# Patient Record
Sex: FEMALE | Race: OTHER | NOT HISPANIC OR LATINO
[De-identification: names, ages, dates, MRNs, and addresses within clinical notes are randomized per-mention and may not be internally consistent; named-entity substitution may affect disease eponyms.]

---

## 2021-05-10 ENCOUNTER — TRANSCRIPTION ENCOUNTER (OUTPATIENT)
Age: 36
End: 2021-05-10

## 2021-05-10 ENCOUNTER — APPOINTMENT (OUTPATIENT)
Dept: PULMONOLOGY | Facility: CLINIC | Age: 36
End: 2021-05-10
Payer: COMMERCIAL

## 2021-05-10 VITALS
DIASTOLIC BLOOD PRESSURE: 70 MMHG | SYSTOLIC BLOOD PRESSURE: 110 MMHG | WEIGHT: 137 LBS | RESPIRATION RATE: 16 BRPM | BODY MASS INDEX: 21.5 KG/M2 | HEART RATE: 91 BPM | OXYGEN SATURATION: 98 % | TEMPERATURE: 97.7 F | HEIGHT: 67 IN

## 2021-05-10 DIAGNOSIS — Z80.3 FAMILY HISTORY OF MALIGNANT NEOPLASM OF BREAST: ICD-10-CM

## 2021-05-10 DIAGNOSIS — Z87.09 PERSONAL HISTORY OF OTHER DISEASES OF THE RESPIRATORY SYSTEM: ICD-10-CM

## 2021-05-10 PROCEDURE — ZZZZZ: CPT

## 2021-05-10 PROCEDURE — 71046 X-RAY EXAM CHEST 2 VIEWS: CPT

## 2021-05-10 PROCEDURE — 94618 PULMONARY STRESS TESTING: CPT

## 2021-05-10 PROCEDURE — 99204 OFFICE O/P NEW MOD 45 MIN: CPT | Mod: 25

## 2021-05-10 PROCEDURE — 94729 DIFFUSING CAPACITY: CPT

## 2021-05-10 PROCEDURE — 95012 NITRIC OXIDE EXP GAS DETER: CPT

## 2021-05-10 PROCEDURE — 94010 BREATHING CAPACITY TEST: CPT

## 2021-05-10 PROCEDURE — 94727 GAS DIL/WSHOT DETER LNG VOL: CPT

## 2021-05-10 PROCEDURE — 99072 ADDL SUPL MATRL&STAF TM PHE: CPT

## 2021-05-10 RX ORDER — ELASTIC BANDAGE 2"X2.2YD
BANDAGE TOPICAL
Refills: 0 | Status: ACTIVE | COMMUNITY

## 2021-05-10 NOTE — ASSESSMENT
[FreeTextEntry1] : Ms. HORTON is a 35 year female with a history of dextrocardia, pneumothorax on the left lung (2015)), VATS pleurodesis (2015), reactive airways growing up (?asthma), receiving IVF for infertility, who now comes in for an initial pulmonary evaluation for intra infertility therapy, SOB, RADS/asthma, prior left sided spontaneous PTX\par \par \par The patient's SOB is felt to be multifactorial:\par -poor mechanics of breathing\par -out of shape\par -Pulmonary\par  -Hx of spontaneous PTX\par  -volume loss (c/w restrictive lung disease)\par  -RADS/asthma\par  -anemia\par -Cardiac\par  -dextrocardia (Daniel Goldberg)\par \par Problem 1: RADS/asthma\par -add Bevespi 2 puffs BID followed by Qvar Redihaler 80 2 puffs BID\par -continue ProAir 2 puffs Q6H, pre-exercise\par - Inhaler technique reviewed as well as oral hygiene technique reviewed with patient. Avoidance of cold air, extremes of temperature, rescue inhaler should be used before exercise. Order of medication reviewed with patient. Recommended use of a cool mist humidifier in the bedroom. \par - Asthma is believed to be caused by inherited (genetic) and environmental factor, but its exact cause is unknown. asthma may be triggered by allergens, lung infections, or irritants in the air. Asthma triggers are different for each person \par \par Problem 2: Allergies\par -get Blood work to include: asthma panel, food IgE panel, IgE level, eosinophil level, vitamin D level\par -Environmental measures for allergies were encouraged including mattress and pillow cover, air purifier, and environmental controls. \par \par Problem 3: Anemia\par -Complete iron studies\par \par Problem 4: Hx of pneumothorax s/p pleurodesis\par -appears to be quiescent\par The patient has a pneumothorax from trauma, iatrogenic, post surgical. You are at risk for one in the future and may need hospitalization. Thoracic surgical evaluation is needed for chest tube and other intervention. \par \par Problem 5: Pre-op - cleared\par -at this point in time there are no absolute pulmonary contraindications to go forward with the planned procedure \par -at the time of surgery s/he should have optimal pain control, incentive spirometry, early ambulation, DVT and GI prophylaxis.\par \par Problem 6:Cardiac\par -Recommend cardiac follow up evaluation with cardiologist Dr. Joel Goldberg if needed \par \par Problem 7:overweight/out of shape\par - Weight loss, exercise and diet control were discussed and are highly encouraged. Treatment options were given such as aqua therapy, and contacting a nutritionist. Recommended to use the elliptical, stationary bike, less use of treadmill. Mindful eating was explained to the patient. Obesity is associated with worsening asthma, SOB, and potential for cardiac disease, diabetes, and other underlying medical conditions.\par \par Problem 8: Poor mechanics of breathing\par - Recommended "The Gift of Maybe" by Susie Ba for anxiety\par -Recommend Wimhof and Buteyko breathing techniques \par - Proper breathing techniques were reviewed with an emphasis on exhalation. Patient instructed to breath in for 1 second and out for four seconds. Patient was encouraged not to talk while walking.\par \par Problem 9: Health Maintenance\par -s/p COVID-19 vaccine x2 2/2021\par -s/p flu shot\par -recommended strep pneumonia vaccines: Prevnar-13 vaccine, follow by Pneumo vaccine 23 one year following\par -recommended early intervention for URIs\par -recommended regular osteoporosis evaluations\par -recommended early dermatological evaluations\par -recommended after the age of 50 to the age of 70, colonoscopy every 5 years\par \par f/u in 6-8 weeks\par pt is encouraged to call or fax the office with any questions or concerns.\par

## 2021-05-10 NOTE — PHYSICAL EXAM
[No Acute Distress] : no acute distress [Normal Oropharynx] : normal oropharynx [II] : Mallampati Class: II [Normal Appearance] : normal appearance [No Neck Mass] : no neck mass [Normal Rate/Rhythm] : normal rate/rhythm [Normal S1, S2] : normal s1, s2 [No Murmurs] : no murmurs [No Resp Distress] : no resp distress [Clear to Auscultation Bilaterally] : clear to auscultation bilaterally [No Abnormalities] : no abnormalities [Benign] : benign [Normal Gait] : normal gait [No Clubbing] : no clubbing [No Cyanosis] : no cyanosis [No Edema] : no edema [FROM] : FROM [Normal Color/ Pigmentation] : normal color/ pigmentation [No Focal Deficits] : no focal deficits [Oriented x3] : oriented x3 [Normal Affect] : normal affect [TextBox_68] : I:E 1:3; no breath sounds on the right side, right sided cardiac sounds, left side clear

## 2021-05-10 NOTE — ADDENDUM
[FreeTextEntry1] : Documented by Wagner Gutierrez acting as a scribe for Dr. Vladimir Bennett on (05/10/2021).\par \par All medical record entries made by the Scribe were at my, Dr. Vladimir Bennett's, direction and personally dictated by me on (05/10/2021). I have reviewed the chart and agree that the record accurately reflects my personal performance of the history, physical exam, assessment and plan. I have also personally directed, reviewed, and agree with the discharge instructions.\par

## 2021-05-10 NOTE — HISTORY OF PRESENT ILLNESS
[TextBox_4] : Ms. HORTON is a 35 year female with a history of dextrocardia, pneumothorax on the left lung (2015)), VATS pleurodesis (2015), reactive airways growing up (?asthma), receiving IVF for infertility, who now comes in for an initial pulmonary evaluation for intra infertility therapy. Her chief complaint is\par -she notes getting SOB, and SOB on stairs\par -she notes the SOB has been consistent\par -she notes cardio makes her SOB\par -she denies snoring\par -she denies hoarseness \par -she notes RLS that can wake her up - feels like a numbness\par -she notes her vision is normal\par -she notes the cold air makes it harder to breathe\par -s/p COVID-19 vaccine 2/21\par -she notes taking a rapid COVID test today and tested negative\par \par - patient denies any headaches, nausea, vomiting, fever, chills, sweats, chest pain, chest pressure, palpitations, coughing, wheezing, fatigue, diarrhea, constipation, dysphagia, myalgias, dizziness, leg swelling, leg pain, itchy eyes, itchy ears, heartburn, reflux or sour taste in the mouth

## 2021-05-10 NOTE — REASON FOR VISIT
[Initial] : an initial visit [TextBox_44] :  intra infertility therapy, SOB, RADS/asthma, prior left sided spontaneous PTX

## 2021-05-10 NOTE — PROCEDURE
[FreeTextEntry1] : CXR revealed a normal sized heart; dextrocardia, volume loss on the right side, left hyperinflation\par \par 6 minute walk test reveals a low saturation of 98% with moderate dyspnea or fatigue; walked 586.8 meters.\par \par Feno was 13; a normal value being less than 25. Fractional exhaled nitric oxide (FENO) is regarded as a simple, noninvasive method for assessing eosinophilic airway inflammation. Produced by a variety of cells within the lung, nitric oxide (NO) concentrations are generally low in healthy individuals. However, high concentrations of NO appear to be involved in nonspecific host defense mechanisms and chronic inflammatory  diseases such as asthma. The American Thoracic Society (ATS) therefore recommended using FENO to aid in the diagnosis and monitoring of eosinophilic airway inflammation and asthma, and for identifying steroid responsive individuals whose chronic respiratory symptoms may be caused by airway inflammation \par \par Full PFT revealed mild restrictive and severe obstructive dysfunction, with a FEV1 of 1.28L, which is 40% of predicted, normal lung volumes, and a diffusion of 23.0, which is 99% of predicted, with a normal flow volume loop \par \par -Images and procedures reviewed in detail and discussed with patient.

## 2021-05-12 RX ORDER — GLYCOPYRROLATE AND FORMOTEROL FUMARATE 9; 4.8 UG/1; UG/1
9-4.8 AEROSOL, METERED RESPIRATORY (INHALATION)
Qty: 3 | Refills: 1 | Status: DISCONTINUED | COMMUNITY
Start: 2021-05-10 | End: 2021-05-12

## 2021-05-26 ENCOUNTER — APPOINTMENT (OUTPATIENT)
Dept: MATERNAL FETAL MEDICINE | Facility: CLINIC | Age: 36
End: 2021-05-26
Payer: COMMERCIAL

## 2021-05-26 ENCOUNTER — ASOB RESULT (OUTPATIENT)
Age: 36
End: 2021-05-26

## 2021-05-26 PROCEDURE — 99204 OFFICE O/P NEW MOD 45 MIN: CPT | Mod: 95

## 2021-09-01 ENCOUNTER — NON-APPOINTMENT (OUTPATIENT)
Age: 36
End: 2021-09-01

## 2021-09-01 ENCOUNTER — APPOINTMENT (OUTPATIENT)
Dept: OBGYN | Facility: CLINIC | Age: 36
End: 2021-09-01
Payer: COMMERCIAL

## 2021-09-01 VITALS
BODY MASS INDEX: 21.97 KG/M2 | HEIGHT: 67 IN | SYSTOLIC BLOOD PRESSURE: 104 MMHG | WEIGHT: 140 LBS | DIASTOLIC BLOOD PRESSURE: 68 MMHG

## 2021-09-01 DIAGNOSIS — Z86.19 PERSONAL HISTORY OF OTHER INFECTIOUS AND PARASITIC DISEASES: ICD-10-CM

## 2021-09-01 DIAGNOSIS — Z00.00 ENCOUNTER FOR GENERAL ADULT MEDICAL EXAMINATION W/OUT ABNORMAL FINDINGS: ICD-10-CM

## 2021-09-01 DIAGNOSIS — Z13.71 ENCOUNTER FOR NONPROCREATIVE SCREENING FOR GENETIC DISEASE CARRIER STATUS: ICD-10-CM

## 2021-09-01 PROCEDURE — 99202 OFFICE O/P NEW SF 15 MIN: CPT | Mod: 25

## 2021-09-01 PROCEDURE — 99395 PREV VISIT EST AGE 18-39: CPT

## 2021-09-01 NOTE — COUNSELING
[Nutrition/ Exercise/ Weight Management] : nutrition, exercise, weight management [Vitamins/Supplements] : vitamins/supplements [Intimate Partner Violence] : intimate partner violence [Vaccines] : vaccines

## 2021-09-02 ENCOUNTER — LABORATORY RESULT (OUTPATIENT)
Age: 36
End: 2021-09-02

## 2021-09-02 NOTE — PHYSICAL EXAM
[Appropriately responsive] : appropriately responsive [Alert] : alert [No Acute Distress] : no acute distress [No Lymphadenopathy] : no lymphadenopathy [Regular Rate Rhythm] : regular rate rhythm [No Murmurs] : no murmurs [Clear to Auscultation B/L] : clear to auscultation bilaterally [Soft] : soft [Non-tender] : non-tender [Non-distended] : non-distended [No HSM] : No HSM [No Lesions] : no lesions [No Mass] : no mass [Oriented x3] : oriented x3 [Examination Of The Breasts] : a normal appearance [No Masses] : no breast masses were palpable [Labia Majora] : normal [Labia Minora] : normal [Normal] : normal [Uterine Adnexae] : normal [FreeTextEntry4] : RRR on right side (pt has dextrocardia) [FreeTextEntry5] : breathe sounds only heard on L side

## 2021-09-02 NOTE — HISTORY OF PRESENT ILLNESS
[Patient reported PAP Smear was normal] : Patient reported PAP Smear was normal [FreeTextEntry1] : 37yo G1 @ 10'1 by FET of 7/12 (YUVAL 3/29) here for new ob visit. Today is her last day of taking progesterone.  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  \par \par Of note pt has dextrocardia and only has left lung (normal function on PFTs). Pt has spont. PThx and had surgery at [facility] (will forward records to us). \par \par Pt is s/p COVID vaccine.  [PapSmeardate] : 2019

## 2021-09-02 NOTE — DISCUSSION/SUMMARY
[FreeTextEntry1] : 35yo G1 @ 10'1 via FET/IVF (YUVAL 3/29/22) with dextrocardia and single L lung. \par \par New OB:\par - Referred to M for h/o IVF, dextrocardia and single L lung. \par - NIPS offered today, new OB labs including PEC labs ordered today\par - Pt to see PCP, Derm and dentist at discussed \par - Start PNV with FA and DHA (pt denies any FHx for her of Partner suggesting need for more than standard dose of FA)\par - Continue reg exercise \par - d/c etoh \par - travel, exercise, diet precautions reviewed\par - RTO 2 wks for NT and 1st tri screen \par - GC/CT done today\par \par Single Lung:\par - Pt urged to make appt to see pulm ASAP . \par - Will see pulm qTrimester (at least) \par \par Dextrocardia:\par - Pt to see Cards ASAp and then qTrimester \par \par Tomeka Rodriguez MD \par Obstetrician/Gynecologist\par \par

## 2021-09-03 ENCOUNTER — ASOB RESULT (OUTPATIENT)
Age: 36
End: 2021-09-03

## 2021-09-03 ENCOUNTER — APPOINTMENT (OUTPATIENT)
Dept: OBGYN | Facility: CLINIC | Age: 36
End: 2021-09-03
Payer: COMMERCIAL

## 2021-09-03 LAB
ABO + RH PNL BLD: NORMAL
ALBUMIN SERPL ELPH-MCNC: 4.3 G/DL
ALP BLD-CCNC: 63 U/L
ALT SERPL-CCNC: 8 U/L
ANION GAP SERPL CALC-SCNC: 18 MMOL/L
AST SERPL-CCNC: 15 U/L
B19V IGG SER QL IA: 0.49 INDEX
B19V IGG+IGM SER-IMP: NEGATIVE
B19V IGG+IGM SER-IMP: NORMAL
B19V IGM FLD-ACNC: 0.09 INDEX
B19V IGM SER-ACNC: NEGATIVE
BACTERIA UR CULT: NORMAL
BASOPHILS # BLD AUTO: 0.04 K/UL
BASOPHILS NFR BLD AUTO: 0.3 %
BILIRUB SERPL-MCNC: <0.2 MG/DL
BLD GP AB SCN SERPL QL: NORMAL
BUN SERPL-MCNC: 12 MG/DL
C TRACH RRNA SPEC QL NAA+PROBE: NOT DETECTED
CALCIUM SERPL-MCNC: 9.8 MG/DL
CHLORIDE SERPL-SCNC: 100 MMOL/L
CMV IGG SERPL QL: <0.2 U/ML
CMV IGG SERPL-IMP: NEGATIVE
CMV IGM SERPL QL: <8 AU/ML
CMV IGM SERPL QL: NEGATIVE
CO2 SERPL-SCNC: 20 MMOL/L
CREAT SERPL-MCNC: 0.71 MG/DL
CREAT SPEC-SCNC: 50 MG/DL
CREAT/PROT UR: 0.1 RATIO
EOSINOPHIL # BLD AUTO: 0.05 K/UL
EOSINOPHIL NFR BLD AUTO: 0.3 %
GLUCOSE SERPL-MCNC: 48 MG/DL
HBV SURFACE AG SER QL: NONREACTIVE
HCT VFR BLD CALC: 38.8 %
HCV AB SER QL: NONREACTIVE
HCV S/CO RATIO: 0.08 S/CO
HGB A MFR BLD: 97.4 %
HGB A2 MFR BLD: 2.6 %
HGB BLD-MCNC: 12.2 G/DL
HGB FRACT BLD-IMP: NORMAL
HIV1+2 AB SPEC QL IA.RAPID: NONREACTIVE
HPV HIGH+LOW RISK DNA PNL CVX: NOT DETECTED
IMM GRANULOCYTES NFR BLD AUTO: 0.6 %
LYMPHOCYTES # BLD AUTO: 1.96 K/UL
LYMPHOCYTES NFR BLD AUTO: 13.7 %
MAN DIFF?: NORMAL
MCHC RBC-ENTMCNC: 29.8 PG
MCHC RBC-ENTMCNC: 31.4 GM/DL
MCV RBC AUTO: 94.9 FL
MEV IGG FLD QL IA: >300 AU/ML
MEV IGG+IGM SER-IMP: POSITIVE
MONOCYTES # BLD AUTO: 0.62 K/UL
MONOCYTES NFR BLD AUTO: 4.3 %
N GONORRHOEA RRNA SPEC QL NAA+PROBE: NOT DETECTED
NEUTROPHILS # BLD AUTO: 11.54 K/UL
NEUTROPHILS NFR BLD AUTO: 80.8 %
PLATELET # BLD AUTO: 339 K/UL
POTASSIUM SERPL-SCNC: 4.4 MMOL/L
PROT SERPL-MCNC: 7.4 G/DL
PROT UR-MCNC: 7 MG/DL
RBC # BLD: 4.09 M/UL
RBC # FLD: 12.1 %
RUBV IGG FLD-ACNC: 4.3 INDEX
RUBV IGG SER-IMP: POSITIVE
RUBV IGM FLD-ACNC: <20 AU/ML
SODIUM SERPL-SCNC: 138 MMOL/L
SOURCE TP AMPLIFICATION: NORMAL
T GONDII AB SER-IMP: NEGATIVE
T GONDII AB SER-IMP: NEGATIVE
T GONDII IGG SER QL: <3 IU/ML
T GONDII IGM SER QL: <3 AU/ML
T PALLIDUM AB SER QL IA: NEGATIVE
URATE SERPL-MCNC: 4 MG/DL
VZV AB TITR SER: POSITIVE
VZV IGG SER IF-ACNC: >4000 INDEX
WBC # FLD AUTO: 14.3 K/UL

## 2021-09-03 PROCEDURE — 76801 OB US < 14 WKS SINGLE FETUS: CPT

## 2021-09-07 ENCOUNTER — APPOINTMENT (OUTPATIENT)
Dept: OBGYN | Facility: CLINIC | Age: 36
End: 2021-09-07

## 2021-09-07 ENCOUNTER — APPOINTMENT (OUTPATIENT)
Dept: MATERNAL FETAL MEDICINE | Facility: CLINIC | Age: 36
End: 2021-09-07
Payer: COMMERCIAL

## 2021-09-07 ENCOUNTER — APPOINTMENT (OUTPATIENT)
Dept: ANTEPARTUM | Facility: CLINIC | Age: 36
End: 2021-09-07

## 2021-09-07 VITALS
SYSTOLIC BLOOD PRESSURE: 96 MMHG | DIASTOLIC BLOOD PRESSURE: 60 MMHG | RESPIRATION RATE: 16 BRPM | BODY MASS INDEX: 21.82 KG/M2 | HEART RATE: 89 BPM | HEIGHT: 67 IN | OXYGEN SATURATION: 98 % | WEIGHT: 139 LBS

## 2021-09-07 DIAGNOSIS — Z36.9 ENCOUNTER FOR ANTENATAL SCREENING, UNSPECIFIED: ICD-10-CM

## 2021-09-07 DIAGNOSIS — Z01.811 ENCOUNTER FOR PREPROCEDURAL RESPIRATORY EXAMINATION: ICD-10-CM

## 2021-09-07 DIAGNOSIS — Z31.69 ENCOUNTER FOR OTHER GENERAL COUNSELING AND ADVICE ON PROCREATION: ICD-10-CM

## 2021-09-07 DIAGNOSIS — Z78.9 OTHER SPECIFIED HEALTH STATUS: ICD-10-CM

## 2021-09-07 PROCEDURE — 99204 OFFICE O/P NEW MOD 45 MIN: CPT

## 2021-09-07 NOTE — DISCUSSION/SUMMARY
[FreeTextEntry1] : The patient is a 36-year-old -0-1-0 being seen today at approximately 10 weeks for maternal dextrocardia with hypoplastic right lung, spontaneous pneumothorax, anxiety and pregnancy achieved through in vitro fertilization. Her obstetrical hisotry is significant for 1 first trimester complete miscarriage.\par \par An ultrasound was performed on September 3 and revealed a single viable intrauterine gestation with size consistent with dates.  No abnormalities noted.  Vital signs today reveal a blood pressure of 96/60 and maternal weight is 139 pounds consistent with a BMI of 21.77 kg.\par \par Congenital dextrocardia;\par \par The patient was born with dextrocardia.  This was discovered at birth.  Of note the maternal right lung was hypoplastic and considered nonfunctional.  The maternal heart had normal anatomy.  She had a spontaneous pneumothorax in the left lung in  which was surgically corrected.  She is being followed by a pulmonologist.  She is on albuterol, Anoro Ellipta and Qvar without problems or complaints.  Pulmonary consultation has been sent under separate cover.  She understands that with each pregnancy her fetus is at risk for a congenital heart defect.  A fetal echo between 20 and 22 weeks will be ordered.  With a normal functioning heart a normal pregnancy should be expected.  She does complain of some shortness of breath which is more consistent with pregnancy and pulmonary function tests were found to be within normal limits.  A maternal echo was to be performed and those results are not available at the time of consultation.  At this time there is NO contraindication to vaginal delivery.\par \par In vitro fertilization;\par \par This pregnancy was achieved through in vitro fertilization.  The etiology for the fertility issue is unknown which is consistent with 70% of couples.  In vitro fertilization pregnancies are at greater risk for congenital heart defects and a fetal echo will be performed.\par \par Maternal anxiety;\par \par Patient has a long history of maternal anxiety.  She is not currently being medicated.  She understands that anxiety and depression during pregnancy can be medically treated if clinically indicated.  She understands that should these problems become a daily issue with her inability to function on a day-to-day basis medical intervention can be entertained.  Problems and complications related to anxiety medication such as Zoloft and Prozac were discussed.  In addition I am concerned about postpartum depression and signs and symptoms of postpartum depression were discussed.  The patient understands that she should reach out for intervention if needed.\par \par COVID-19 vaccination;\par \par The patient has completed her COVID-19 vaccination program using the Moderna vaccine.  No problems or complications were noted with that vaccination.\par \par Her mother has breast cancer.  She has dextrocardia and a spontaneous pneumothorax.  She has no known allergies to medications and denies alcohol, tobacco or drug use.\par \par I spent a total of 47 minutes reviewing the patient's prenatal record, prenatal blood work, previous consultations and ultrasound reports counseling and coordinating care.\par \par Recommendations;\par \par 1.  Ultra-Screen evaluation in 2 weeks is scheduled.\par 2.  Comprehensive ultrasound at 20 weeks recommended.\par 3.  Discontinue low-dose aspirin after 12 weeks.\par 4.  Fetal echo between 20 and 22 weeks is recommended.\par 5.  Antianxiety medications to be used if clinically indicated.\par 6.  Follow-up maternal-fetal medicine consultation as clinically indicated.\par 7.  Follow-up pulmonary consultation.\par 8.  Cardiac consultation is recommended.

## 2021-09-08 ENCOUNTER — NON-APPOINTMENT (OUTPATIENT)
Age: 36
End: 2021-09-08

## 2021-09-09 ENCOUNTER — NON-APPOINTMENT (OUTPATIENT)
Age: 36
End: 2021-09-09

## 2021-09-13 ENCOUNTER — NON-APPOINTMENT (OUTPATIENT)
Age: 36
End: 2021-09-13

## 2021-09-14 LAB
CFTR MUT TESTED BLD/T: NEGATIVE
CYTOLOGY CVX/VAG DOC THIN PREP: NORMAL
M TB IFN-G BLD-IMP: NEGATIVE
MEV IGM SER QL: <0.91 ISR
QUANTIFERON TB PLUS MITOGEN MINUS NIL: 1.01 IU/ML
QUANTIFERON TB PLUS NIL: 0.01 IU/ML
QUANTIFERON TB PLUS TB1 MINUS NIL: 0 IU/ML
QUANTIFERON TB PLUS TB2 MINUS NIL: 0 IU/ML

## 2021-09-17 ENCOUNTER — ASOB RESULT (OUTPATIENT)
Age: 36
End: 2021-09-17

## 2021-09-17 ENCOUNTER — APPOINTMENT (OUTPATIENT)
Dept: OBGYN | Facility: CLINIC | Age: 36
End: 2021-09-17
Payer: COMMERCIAL

## 2021-09-17 PROCEDURE — 76813 OB US NUCHAL MEAS 1 GEST: CPT

## 2021-09-20 ENCOUNTER — APPOINTMENT (OUTPATIENT)
Dept: OBGYN | Facility: CLINIC | Age: 36
End: 2021-09-20
Payer: COMMERCIAL

## 2021-09-20 VITALS
WEIGHT: 142 LBS | SYSTOLIC BLOOD PRESSURE: 112 MMHG | HEIGHT: 67 IN | DIASTOLIC BLOOD PRESSURE: 78 MMHG | BODY MASS INDEX: 22.29 KG/M2

## 2021-09-20 VITALS — TEMPERATURE: 97.6 F

## 2021-09-20 PROCEDURE — 36415 COLL VENOUS BLD VENIPUNCTURE: CPT

## 2021-09-20 PROCEDURE — 0500F INITIAL PRENATAL CARE VISIT: CPT

## 2021-09-20 PROCEDURE — 99213 OFFICE O/P EST LOW 20 MIN: CPT

## 2021-09-21 ENCOUNTER — NON-APPOINTMENT (OUTPATIENT)
Age: 36
End: 2021-09-21

## 2021-09-21 LAB
BILIRUB UR QL STRIP: NORMAL
CLARITY UR: CLEAR
COLLECTION METHOD: NORMAL
GLUCOSE UR-MCNC: NORMAL
HCG UR QL: 0.2 EU/DL
HGB UR QL STRIP.AUTO: NORMAL
KETONES UR-MCNC: NORMAL
LEUKOCYTE ESTERASE UR QL STRIP: NORMAL
NITRITE UR QL STRIP: NORMAL
PH UR STRIP: 5.5
PROT UR STRIP-MCNC: NORMAL
SP GR UR STRIP: 1.01

## 2021-09-22 LAB — LEAD BLD-MCNC: <1 UG/DL

## 2021-09-23 ENCOUNTER — NON-APPOINTMENT (OUTPATIENT)
Age: 36
End: 2021-09-23

## 2021-09-30 ENCOUNTER — NON-APPOINTMENT (OUTPATIENT)
Age: 36
End: 2021-09-30

## 2021-10-06 LAB
ADDENDUM DOC: NORMAL
AFP PNL SERPL: NORMAL
AFP SERPL-ACNC: NORMAL
CLARIM 22Q11.2: NORMAL
CLARIM ADDITIONAL INFO: NORMAL
CLARIM CHROMOSOME 13: NORMAL
CLARIM CHROMOSOME 18: NORMAL
CLARIM CHROMOSOME 21: NORMAL
CLARIM SEX CHROMOSOMES: NORMAL
CLARITEST NIPT W/MICRO: NORMAL
CLINICAL BIOCHEMIST REVIEW: NORMAL
FREE BETA HCG 1ST TRIMESTER: NORMAL
INHIBIN-A 1ST TRIMESTER: NORMAL
Lab: NORMAL
MATERNAL WEIGHT (LBS):: 140
NASAL BONE: PRESENT
NOTES NTD: NORMAL
NT: NORMAL
PAPP-A SERPL-ACNC: NORMAL
PIGF SER-MCNC: NORMAL
TRISOMY 18/3: NORMAL
TYPE OF PREGNANCY:: NORMAL

## 2021-10-11 ENCOUNTER — LABORATORY RESULT (OUTPATIENT)
Age: 36
End: 2021-10-11

## 2021-10-12 ENCOUNTER — APPOINTMENT (OUTPATIENT)
Dept: OBGYN | Facility: CLINIC | Age: 36
End: 2021-10-12
Payer: COMMERCIAL

## 2021-10-12 VITALS
BODY MASS INDEX: 22.91 KG/M2 | HEIGHT: 67 IN | DIASTOLIC BLOOD PRESSURE: 60 MMHG | WEIGHT: 146 LBS | SYSTOLIC BLOOD PRESSURE: 100 MMHG

## 2021-10-12 VITALS — TEMPERATURE: 97.2 F

## 2021-10-12 LAB
BILIRUB UR QL STRIP: NORMAL
CLARITY UR: CLEAR
COLLECTION METHOD: NORMAL
GLUCOSE UR-MCNC: NORMAL
HCG UR QL: 0.2 EU/DL
HGB UR QL STRIP.AUTO: NORMAL
KETONES UR-MCNC: NORMAL
LEUKOCYTE ESTERASE UR QL STRIP: NORMAL
NITRITE UR QL STRIP: NORMAL
PH UR STRIP: 5.5
PROT UR STRIP-MCNC: NORMAL
SP GR UR STRIP: 1.02

## 2021-10-12 PROCEDURE — 36415 COLL VENOUS BLD VENIPUNCTURE: CPT

## 2021-10-12 PROCEDURE — 0502F SUBSEQUENT PRENATAL CARE: CPT

## 2021-10-12 PROCEDURE — 99213 OFFICE O/P EST LOW 20 MIN: CPT

## 2021-10-19 ENCOUNTER — NON-APPOINTMENT (OUTPATIENT)
Age: 36
End: 2021-10-19

## 2021-10-19 ENCOUNTER — TRANSCRIPTION ENCOUNTER (OUTPATIENT)
Age: 36
End: 2021-10-19

## 2021-10-19 ENCOUNTER — APPOINTMENT (OUTPATIENT)
Dept: PULMONOLOGY | Facility: CLINIC | Age: 36
End: 2021-10-19
Payer: COMMERCIAL

## 2021-10-19 VITALS
HEIGHT: 67 IN | HEART RATE: 89 BPM | DIASTOLIC BLOOD PRESSURE: 60 MMHG | BODY MASS INDEX: 23.54 KG/M2 | OXYGEN SATURATION: 99 % | TEMPERATURE: 97.8 F | RESPIRATION RATE: 16 BRPM | WEIGHT: 150 LBS | SYSTOLIC BLOOD PRESSURE: 101 MMHG

## 2021-10-19 PROCEDURE — 94010 BREATHING CAPACITY TEST: CPT

## 2021-10-19 PROCEDURE — 99214 OFFICE O/P EST MOD 30 MIN: CPT | Mod: 25

## 2021-10-19 PROCEDURE — 95012 NITRIC OXIDE EXP GAS DETER: CPT

## 2021-10-19 RX ORDER — BECLOMETHASONE DIPROPIONATE 80 UG/1
80 AEROSOL, METERED RESPIRATORY (INHALATION)
Refills: 0 | Status: DISCONTINUED | COMMUNITY
End: 2021-10-19

## 2021-10-19 NOTE — ADDENDUM
[FreeTextEntry1] : Documented by Delonte Muñoz acting as a scribe for Dr. Vladimir Bennett on (10/19/2021).\par \par All medical record entries made by the Scribe were at my, Dr. Vladimir Bennett's, direction and personally dictated by me on (10/19/2021). I have reviewed the chart and agree that the record accurately reflects my personal performance of the history, physical exam, assessment and plan. I have also personally directed, reviewed, and agree with the discharge instructions.\par

## 2021-10-19 NOTE — REASON FOR VISIT
[Follow-Up] : a follow-up visit [TextBox_44] :  intra infertility therapy, SOB, RADS/asthma, prior left sided spontaneous PTX

## 2021-10-19 NOTE — ASSESSMENT
[FreeTextEntry1] : Ms. HORTON is a 36 year female with a history of dextrocardia, pneumothorax on the left lung (2015)), VATS pleurodesis (2015), reactive airways growing up (?asthma), s/p IVF for infertility, who now comes in for a follow up  pulmonary evaluation for infertility therapy, SOB, RADS/asthma, prior left sided spontaneous PTX - stable\par \par \par The patient's SOB is felt to be multifactorial:\par -poor mechanics of breathing\par -out of shape\par -Pulmonary\par  -Hx of spontaneous PTX\par  -volume loss (c/w restrictive lung disease)\par  -RADS/asthma\par  -anemia\par -Cardiac\par  -dextrocardia (Joel Goldberg)\par \par Problem 1: RADS/asthma\par -continue Bevespi 2 puffs BID followed by Anoro at 1 inhalation QD \par -continue ProAir 2 puffs Q6H, pre-exercise\par - Inhaler technique reviewed as well as oral hygiene technique reviewed with patient. Avoidance of cold air, extremes of temperature, rescue inhaler should be used before exercise. Order of medication reviewed with patient. Recommended use of a cool mist humidifier in the bedroom. \par - Asthma is believed to be caused by inherited (genetic) and environmental factor, but its exact cause is unknown. asthma may be triggered by allergens, lung infections, or irritants in the air. Asthma triggers are different for each person \par \par Problem 2: Allergies\par -get Blood work to include: asthma panel, food IgE panel, IgE level, eosinophil level, vitamin D level ([place])\par -Environmental measures for allergies were encouraged including mattress and pillow cover, air purifier, and environmental controls. \par \par Problem 3: Anemia\par -Complete iron studies\par \par Problem 4: Hx of pneumothorax s/p pleurodesis\par -appears to be quiescent\par The patient has a pneumothorax from trauma, iatrogenic, post surgical. You are at risk for one in the future and may need hospitalization. Thoracic surgical evaluation is needed for chest tube and other intervention. \par \par \par Problem 5:Cardiac\par -Recommend cardiac follow up evaluation with cardiologist Dr. Joel Goldberg if needed \par \par Problem 6:overweight/out of shape\par - Weight loss, exercise and diet control were discussed and are highly encouraged. Treatment options were given such as aqua therapy, and contacting a nutritionist. Recommended to use the elliptical, stationary bike, less use of treadmill. Mindful eating was explained to the patient. Obesity is associated with worsening asthma, SOB, and potential for cardiac disease, diabetes, and other underlying medical conditions.\par \par Problem 7: Poor mechanics of breathing\par - Recommended "The Gift of Maybe" by Susie Ba for anxiety\par -Recommend Wimhof and Buteyko breathing techniques \par - Proper breathing techniques were reviewed with an emphasis on exhalation. Patient instructed to breath in for 1 second and out for four seconds. Patient was encouraged not to talk while walking.\par \par Problem 8: Health Maintenance\par -s/p COVID-19 vaccine x2 2/2021\par -s/p flu shot 2021 \par -recommended strep pneumonia vaccines: Prevnar-13 vaccine, follow by Pneumo vaccine 23 one year following\par -recommended early intervention for URIs\par -recommended regular osteoporosis evaluations\par -recommended early dermatological evaluations\par -recommended after the age of 50 to the age of 70, colonoscopy every 5 years\par \par f/u in 6-8 weeks\par pt is encouraged to call or fax the office with any questions or concerns.\par

## 2021-10-19 NOTE — PHYSICAL EXAM
[No Acute Distress] : no acute distress [Normal Oropharynx] : normal oropharynx [II] : Mallampati Class: II [Normal Appearance] : normal appearance [No Neck Mass] : no neck mass [Normal Rate/Rhythm] : normal rate/rhythm [Normal S1, S2] : normal s1, s2 [No Murmurs] : no murmurs [No Resp Distress] : no resp distress [Clear to Auscultation Bilaterally] : clear to auscultation bilaterally [Benign] : benign [No Abnormalities] : no abnormalities [Normal Gait] : normal gait [No Clubbing] : no clubbing [No Cyanosis] : no cyanosis [No Edema] : no edema [FROM] : FROM [Normal Color/ Pigmentation] : normal color/ pigmentation [No Focal Deficits] : no focal deficits [Oriented x3] : oriented x3 [Normal Affect] : normal affect [TextBox_68] : I:E 1:3; coarse breath sounds bilaterally

## 2021-10-19 NOTE — PROCEDURE
[FreeTextEntry1] : Feno was 18: a normal value being less than 25. Fractional exhaled nitric oxide (FENO) is regarded as a simple, noninvasive method for assessing eosinophilic airway inflammation. Produced by a variety of cells within the lung, nitric oxide (NO) concentrations are generally low in healthy individuals. However, high concentrations of NO appear to be involved in nonspecific host defense mechanisms and chronic inflammatory  diseases such as asthma. The American Thoracic Society (ATS) therefore recommended using FENO to aid in the diagnosis and monitoring of eosinophilic airway inflammation and asthma, and for identifying steroid responsive individuals whose chronic respiratory symptoms may be caused by airway inflammation \par \par \par PFT revealed mild restrictive and moderate obstructive dysfunction, with a FEV1 of 1.9L, which is 56% of predicted, with a normal flow volume loop\par \par -Images and procedures reviewed in detail and discussed with patient. \par

## 2021-10-19 NOTE — HISTORY OF PRESENT ILLNESS
[TextBox_4] : Ms. HORTON is a 36 year female with a history of dextrocardia, pneumothorax on the left lung (2015)), VATS pleurodesis (2015), reactive airways growing up (?asthma), receiving IVF for infertility, who now comes in for a follow up pulmonary evaluation. Her chief complaint is\par -She notes now 17 weeks pregnant\par -She notes gaining 10-15lbs since starting her pregnancy \par -she notes feeling well in general\par -she notes walking for exercise \par -She notes her breathing is controlled\par -She notes drinking 64 ounces of water per day \par -She notes needing an OBGYN to deliver her baby at [facility] \par -She notes her sinuses are clear\par -s/p successful IVF\par -\par \par - patient denies any headaches, nausea, vomiting, fever, chills, sweats, chest pain, chest pressure, palpitations, coughing, wheezing, fatigue, diarrhea, constipation, dysphagia, myalgias, dizziness, leg swelling, leg pain, itchy eyes, itchy ears, heartburn, reflux or sour taste in the mouth

## 2021-10-20 ENCOUNTER — NON-APPOINTMENT (OUTPATIENT)
Age: 36
End: 2021-10-20

## 2021-10-23 ENCOUNTER — LABORATORY RESULT (OUTPATIENT)
Age: 36
End: 2021-10-23

## 2021-10-24 LAB
24R-OH-CALCIDIOL SERPL-MCNC: 142 PG/ML
25(OH)D3 SERPL-MCNC: 45.4 NG/ML
BASOPHILS # BLD AUTO: 0.03 K/UL
BASOPHILS NFR BLD AUTO: 0.3 %
EOSINOPHIL # BLD AUTO: 0.07 K/UL
EOSINOPHIL NFR BLD AUTO: 0.7 %
FERRITIN SERPL-MCNC: 36 NG/ML
HCT VFR BLD CALC: 34.7 %
HGB BLD-MCNC: 11.2 G/DL
IMM GRANULOCYTES NFR BLD AUTO: 1.2 %
IRON SATN MFR SERPL: 28 %
IRON SERPL-MCNC: 97 UG/DL
LYMPHOCYTES # BLD AUTO: 1.59 K/UL
LYMPHOCYTES NFR BLD AUTO: 15.1 %
MAN DIFF?: NORMAL
MCHC RBC-ENTMCNC: 30.4 PG
MCHC RBC-ENTMCNC: 32.3 GM/DL
MCV RBC AUTO: 94.3 FL
MONOCYTES # BLD AUTO: 0.52 K/UL
MONOCYTES NFR BLD AUTO: 4.9 %
NEUTROPHILS # BLD AUTO: 8.18 K/UL
NEUTROPHILS NFR BLD AUTO: 77.8 %
PLATELET # BLD AUTO: 253 K/UL
RBC # BLD: 3.68 M/UL
RBC # FLD: 12.9 %
TIBC SERPL-MCNC: 350 UG/DL
UIBC SERPL-MCNC: 252 UG/DL
WBC # FLD AUTO: 10.52 K/UL

## 2021-10-25 LAB
A ALTERNATA IGE QN: 0.69 KUA/L
A FUMIGATUS IGE QN: 0.67 KUA/L
C ALBICANS IGE QN: 0.81 KUA/L
C HERBARUM IGE QN: 0.54 KUA/L
CAT DANDER IGE QN: 1.06 KUA/L
CLAM IGE QN: <0.1 KUA/L
CODFISH IGE QN: <0.1 KUA/L
COMMON RAGWEED IGE QN: <0.1 KUA/L
CORN IGE QN: <0.1 KUA/L
COW MILK IGE QN: <0.1 KUA/L
D FARINAE IGE QN: 0.15 KUA/L
D PTERONYSS IGE QN: 0.19 KUA/L
DEPRECATED A ALTERNATA IGE RAST QL: 1
DEPRECATED A FUMIGATUS IGE RAST QL: 1
DEPRECATED C ALBICANS IGE RAST QL: 2
DEPRECATED C HERBARUM IGE RAST QL: 1
DEPRECATED CAT DANDER IGE RAST QL: 2
DEPRECATED CLAM IGE RAST QL: 0
DEPRECATED CODFISH IGE RAST QL: 0
DEPRECATED COMMON RAGWEED IGE RAST QL: 0
DEPRECATED CORN IGE RAST QL: 0
DEPRECATED COW MILK IGE RAST QL: 0
DEPRECATED D FARINAE IGE RAST QL: NORMAL
DEPRECATED D PTERONYSS IGE RAST QL: NORMAL
DEPRECATED DOG DANDER IGE RAST QL: 2
DEPRECATED EGG WHITE IGE RAST QL: 0
DEPRECATED M RACEMOSUS IGE RAST QL: 0
DEPRECATED PEANUT IGE RAST QL: 0
DEPRECATED ROACH IGE RAST QL: 0
DEPRECATED SCALLOP IGE RAST QL: <0.1 KUA/L
DEPRECATED SESAME SEED IGE RAST QL: 0
DEPRECATED SHRIMP IGE RAST QL: 0
DEPRECATED SOYBEAN IGE RAST QL: 0
DEPRECATED TIMOTHY IGE RAST QL: 0
DEPRECATED WALNUT IGE RAST QL: 0
DEPRECATED WHEAT IGE RAST QL: 0
DEPRECATED WHITE OAK IGE RAST QL: 0
DOG DANDER IGE QN: 0.87 KUA/L
EGG WHITE IGE QN: <0.1 KUA/L
M RACEMOSUS IGE QN: <0.1 KUA/L
PEANUT IGE QN: <0.1 KUA/L
ROACH IGE QN: <0.1 KUA/L
SCALLOP IGE QN: 0
SCALLOP IGE QN: <0.1 KUA/L
SESAME SEED IGE QN: <0.1 KUA/L
SOYBEAN IGE QN: <0.1 KUA/L
TIMOTHY IGE QN: <0.1 KUA/L
TOTAL IGE SMQN RAST: 40 KU/L
WALNUT IGE QN: <0.1 KUA/L
WHEAT IGE QN: <0.1 KUA/L
WHITE OAK IGE QN: <0.1 KUA/L

## 2021-10-26 DIAGNOSIS — Z3A.16 16 WEEKS GESTATION OF PREGNANCY: ICD-10-CM

## 2021-10-26 DIAGNOSIS — Z3A.10 10 WEEKS GESTATION OF PREGNANCY: ICD-10-CM

## 2021-10-26 DIAGNOSIS — Z3A.12 12 WEEKS GESTATION OF PREGNANCY: ICD-10-CM

## 2021-11-06 ENCOUNTER — RX RENEWAL (OUTPATIENT)
Age: 36
End: 2021-11-06

## 2021-11-11 ENCOUNTER — APPOINTMENT (OUTPATIENT)
Dept: ANTEPARTUM | Facility: CLINIC | Age: 36
End: 2021-11-11
Payer: COMMERCIAL

## 2021-11-11 ENCOUNTER — ASOB RESULT (OUTPATIENT)
Age: 36
End: 2021-11-11

## 2021-11-11 ENCOUNTER — APPOINTMENT (OUTPATIENT)
Dept: PEDIATRIC CARDIOLOGY | Facility: CLINIC | Age: 36
End: 2021-11-11
Payer: COMMERCIAL

## 2021-11-11 ENCOUNTER — APPOINTMENT (OUTPATIENT)
Dept: OBGYN | Facility: CLINIC | Age: 36
End: 2021-11-11
Payer: COMMERCIAL

## 2021-11-11 VITALS
DIASTOLIC BLOOD PRESSURE: 60 MMHG | SYSTOLIC BLOOD PRESSURE: 100 MMHG | WEIGHT: 150.56 LBS | HEIGHT: 67 IN | BODY MASS INDEX: 23.63 KG/M2

## 2021-11-11 PROCEDURE — 76825 ECHO EXAM OF FETAL HEART: CPT

## 2021-11-11 PROCEDURE — 76820 UMBILICAL ARTERY ECHO: CPT

## 2021-11-11 PROCEDURE — 99243 OFF/OP CNSLTJ NEW/EST LOW 30: CPT

## 2021-11-11 PROCEDURE — 0502F SUBSEQUENT PRENATAL CARE: CPT

## 2021-11-11 PROCEDURE — 76811 OB US DETAILED SNGL FETUS: CPT

## 2021-11-11 PROCEDURE — 93325 DOPPLER ECHO COLOR FLOW MAPG: CPT | Mod: 59

## 2021-11-11 PROCEDURE — 76827 ECHO EXAM OF FETAL HEART: CPT

## 2021-11-11 PROCEDURE — 59426 ANTEPARTUM CARE ONLY: CPT

## 2021-11-11 PROCEDURE — 76821 MIDDLE CEREBRAL ARTERY ECHO: CPT

## 2021-11-12 ENCOUNTER — APPOINTMENT (OUTPATIENT)
Dept: MATERNAL FETAL MEDICINE | Facility: CLINIC | Age: 36
End: 2021-11-12
Payer: COMMERCIAL

## 2021-11-12 PROCEDURE — 99214 OFFICE O/P EST MOD 30 MIN: CPT | Mod: 95

## 2021-11-12 RX ORDER — CLOMIPHENE CITRATE 50 MG/1
50 TABLET ORAL
Qty: 10 | Refills: 0 | Status: DISCONTINUED | COMMUNITY
Start: 2021-05-27

## 2021-11-12 RX ORDER — CETRORELIX ACETATE 0.25 MG
0.25 KIT SUBCUTANEOUS
Qty: 10 | Refills: 0 | Status: COMPLETED | COMMUNITY
Start: 2021-07-06

## 2021-11-12 NOTE — DISCUSSION/SUMMARY
[FreeTextEntry1] : Please see the previous consultation for the patient's medical and obstetrical history.  Patient is being seen today at approximately 20 weeks estimated gestational age for maternal dextrocardia, history of pneumothorax, IVF pregnancy and anxiety.\par \par A comprehensive ultrasound was performed yesterday and revealed a single viable intrauterine gestation with size consistent with dates.  No gross or soft markers associated with fetal aneuploidy were noted.  Vital signs on  revealed a blood pressure of 100/60, maternal weight was 150.9 pounds consistent with a BMI of 23.5 EKG.\par \par Maternal dextrocardia;\par \par The patient has been seen by a cardiologist and that consultation letter is not available to me at time of consultation.  In addition she has been seen by pulmonary and a consultation letter was sent under separate cover.  At this time the patient is stable without problems or complaints.  A fetal echo was performed and was found to be within normal limits without evidence of congenital heart defect.  At this time a growth scan in 8 weeks is recommended.  Medical intervention is not indicated at this time.\par \par The patient has had all  genetic testing performed and they were found to be within normal limits with low risk for fetal aneuploidy as well as low risk for open neural tube defect.\par \par COVID-19 vaccination;\par \par The patient has completed her COVID-19 vaccination with the Moderna vaccine.  She has been advised by pulmonary to wait until January for the new modernal booster specific for the delta variant we are currently dealing with.\par \par No changes in the current medical management are indicated.  Follow-up maternal-fetal medicine consultation will be performed if clinically indicated.  The patient will discuss the route of delivery with her cardiologist and pulmonary specialist, however elective  section may be chosen.\par \par I spent a total of 34 minutes using audio and visual technologies with the patient located at her job and done from our [place] office reviewing prenatal record, prenatal blood work, previous consultations and ultrasound reports counseling and coordinating care.\par \par Recommendations;\par \par 1.  Growth scan at 28 weeks and between 34 and 36 weeks.\par 2.  Delivery mode to be discussed with her cardiologist, pulmonary specialist and with Dr. Brush.\par 3.  Follow-up maternal-fetal medicine consultation as clinically indicated.

## 2021-12-03 ENCOUNTER — NON-APPOINTMENT (OUTPATIENT)
Age: 36
End: 2021-12-03

## 2021-12-07 ENCOUNTER — APPOINTMENT (OUTPATIENT)
Dept: OBGYN | Facility: CLINIC | Age: 36
End: 2021-12-07
Payer: COMMERCIAL

## 2021-12-07 ENCOUNTER — APPOINTMENT (OUTPATIENT)
Dept: FAMILY MEDICINE | Facility: CLINIC | Age: 36
End: 2021-12-07

## 2021-12-07 VITALS
HEIGHT: 67 IN | WEIGHT: 162 LBS | DIASTOLIC BLOOD PRESSURE: 77 MMHG | SYSTOLIC BLOOD PRESSURE: 110 MMHG | BODY MASS INDEX: 25.43 KG/M2

## 2021-12-07 PROCEDURE — 0502F SUBSEQUENT PRENATAL CARE: CPT

## 2021-12-29 ENCOUNTER — APPOINTMENT (OUTPATIENT)
Dept: OBGYN | Facility: CLINIC | Age: 36
End: 2021-12-29
Payer: COMMERCIAL

## 2021-12-29 VITALS
WEIGHT: 171 LBS | SYSTOLIC BLOOD PRESSURE: 102 MMHG | HEIGHT: 67 IN | DIASTOLIC BLOOD PRESSURE: 60 MMHG | BODY MASS INDEX: 26.84 KG/M2

## 2021-12-29 PROCEDURE — 36415 COLL VENOUS BLD VENIPUNCTURE: CPT

## 2021-12-29 PROCEDURE — 81003 URINALYSIS AUTO W/O SCOPE: CPT | Mod: QW

## 2021-12-29 PROCEDURE — 0502F SUBSEQUENT PRENATAL CARE: CPT

## 2021-12-30 LAB
BASOPHILS # BLD AUTO: 0.08 K/UL
BASOPHILS NFR BLD AUTO: 0.5 %
EOSINOPHIL # BLD AUTO: 0.06 K/UL
EOSINOPHIL NFR BLD AUTO: 0.4 %
GLUCOSE 1H P 50 G GLC PO SERPL-MCNC: 92 MG/DL
HCT VFR BLD CALC: 36.3 %
HGB BLD-MCNC: 11.4 G/DL
IMM GRANULOCYTES NFR BLD AUTO: 3.5 %
LYMPHOCYTES # BLD AUTO: 1.75 K/UL
LYMPHOCYTES NFR BLD AUTO: 11.6 %
MAN DIFF?: NORMAL
MCHC RBC-ENTMCNC: 31.3 PG
MCHC RBC-ENTMCNC: 31.4 GM/DL
MCV RBC AUTO: 99.7 FL
MONOCYTES # BLD AUTO: 0.69 K/UL
MONOCYTES NFR BLD AUTO: 4.6 %
NEUTROPHILS # BLD AUTO: 12 K/UL
NEUTROPHILS NFR BLD AUTO: 79.4 %
PLATELET # BLD AUTO: 247 K/UL
RBC # BLD: 3.64 M/UL
RBC # FLD: 13.4 %
WBC # FLD AUTO: 15.11 K/UL

## 2022-01-11 ENCOUNTER — NON-APPOINTMENT (OUTPATIENT)
Age: 37
End: 2022-01-11

## 2022-01-17 ENCOUNTER — APPOINTMENT (OUTPATIENT)
Dept: OBGYN | Facility: CLINIC | Age: 37
End: 2022-01-17
Payer: COMMERCIAL

## 2022-01-17 VITALS
BODY MASS INDEX: 27.31 KG/M2 | WEIGHT: 174 LBS | SYSTOLIC BLOOD PRESSURE: 122 MMHG | DIASTOLIC BLOOD PRESSURE: 60 MMHG | HEIGHT: 67 IN

## 2022-01-17 PROCEDURE — 0502F SUBSEQUENT PRENATAL CARE: CPT

## 2022-01-17 PROCEDURE — 81003 URINALYSIS AUTO W/O SCOPE: CPT | Mod: QW

## 2022-01-18 ENCOUNTER — APPOINTMENT (OUTPATIENT)
Dept: PULMONOLOGY | Facility: CLINIC | Age: 37
End: 2022-01-18
Payer: COMMERCIAL

## 2022-01-18 PROCEDURE — 99214 OFFICE O/P EST MOD 30 MIN: CPT | Mod: 95

## 2022-01-18 NOTE — HISTORY OF PRESENT ILLNESS
[Home] : at home, [unfilled] , at the time of the visit. [Medical Office: ([facility] [place])___] : at the medical office located in  [Verbal consent obtained from patient] : the patient, [unfilled] [TextBox_4] : Ms. HORTON is a 36 year female with a history of dextrocardia, pneumothorax on the left lung (2015)), VATS pleurodesis (2015), reactive airways growing up (?asthma), receiving IVF for infertility, who now comes in via video call for a follow up pulmonary evaluation. Her chief complaint is\par -she notes her mother and sister got COVID around [place] time but she did not get it from them\par -she notes feeling very fatigued currently during her pregnancy\par -she notes she is 30 weeks pregnant\par -she notes she is not sleeping well\par -she notes gaining 40 lbs since she got pregnant\par -she denies coughing or wheezing\par -she notes she is getting SOB going up stairs\par -she notes she is taking an Rx to avoid constipation\par -s/p COVID-19 vaccine x3\par -she notes her sinuses were a little congested a few weeks ago but not recently\par \par -patient denies any headaches, nausea, vomiting, fever, chills, sweats, chest pain, chest pressure, palpitations, coughing, wheezing, diarrhea, constipation, dysphagia, myalgias, dizziness, leg swelling, leg pain, itchy eyes, itchy ears, heartburn, reflux or sour taste in the mouth

## 2022-01-18 NOTE — ASSESSMENT
[FreeTextEntry1] : Ms. HORTON is a 36 year female with a history of dextrocardia, pneumothorax on the left lung (2015)), VATS pleurodesis (2015), reactive airways growing up (?asthma), s/p IVF for infertility, who now comes in via video call for a follow up  pulmonary evaluation for infertility therapy, SOB, RADS/asthma, prior left sided spontaneous PTX - stable; #1 issue is fatigue / weight gain 40 lbs (30 weeks pregnant) / SOB (CORREIA)\par \par \par The patient's SOB is felt to be multifactorial:\par -poor mechanics of breathing\par -out of shape\par -Pulmonary\par  -Hx of spontaneous PTX\par  -volume loss (c/w restrictive lung disease)\par  -RADS/asthma\par  -anemia\par -Cardiac\par  -dextrocardia (Daniel Goldberg)\par \par Problem 1: RADS/asthma\par -continue Bevespi 2 puffs BID followed by Anoro at 1 inhalation QD followed by Qvar Redihaler 80 2 puffs BID \par -continue ProAir 2 puffs Q6H, pre-exercise\par - Inhaler technique reviewed as well as oral hygiene technique reviewed with patient. Avoidance of cold air, extremes of temperature, rescue inhaler should be used before exercise. Order of medication reviewed with patient. Recommended use of a cool mist humidifier in the bedroom. \par - Asthma is believed to be caused by inherited (genetic) and environmental factor, but its exact cause is unknown. asthma may be triggered by allergens, lung infections, or irritants in the air. Asthma triggers are different for each person \par \par Problem 2: Allergies (NC)\par -get Blood work to include: asthma panel, food IgE panel, IgE level, eosinophil level, vitamin D level (NC)\par -Environmental measures for allergies were encouraged including mattress and pillow cover, air purifier, and environmental controls. \par \par Problem 3: Anemia\par -Complete iron studies (NC)\par \par Problem 4: Hx of pneumothorax s/p pleurodesis\par -appears to be quiescent\par The patient has a pneumothorax from trauma, iatrogenic, post surgical. You are at risk for one in the future and may need hospitalization. Thoracic surgical evaluation is needed for chest tube and other intervention. \par \par \par Problem 5:Cardiac\par -Recommend cardiac follow up evaluation with cardiologist Dr. Joel Goldberg if needed \par \par Problem 6:overweight/out of shape\par -Recommend "Muniq" OTC for weight loss, energy, and blood sugar \par -Recommended to limit salt / calories \par - Weight loss, exercise and diet control were discussed and are highly encouraged. Treatment options were given such as aqua therapy, and contacting a nutritionist. Recommended to use the elliptical, stationary bike, less use of treadmill. Mindful eating was explained to the patient. Obesity is associated with worsening asthma, SOB, and potential for cardiac disease, diabetes, and other underlying medical conditions.\par \par Problem 7: Poor mechanics of breathing\par - Recommended "The Gift of Maybe" by Susie Ba for anxiety\par -Recommend Ana Posada and Eleonora breathing techniques \par - Proper breathing techniques were reviewed with an emphasis on exhalation. Patient instructed to breath in for 1 second and out for four seconds. Patient was encouraged not to talk while walking.\par \par Problem 8: Health Maintenance\par -s/p COVID-19 vaccine x3 2/2021\par -s/p flu shot 2021 \par -recommended strep pneumonia vaccines: Prevnar-13 vaccine, follow by Pneumo vaccine 23 one year following\par -recommended early intervention for URIs\par -recommended regular osteoporosis evaluations\par -recommended early dermatological evaluations\par -recommended after the age of 50 to the age of 70, colonoscopy every 5 years\par \par f/u in 6-8 weeks\par pt is encouraged to call or fax the office with any questions or concerns.\par

## 2022-01-18 NOTE — ADDENDUM
[FreeTextEntry1] : Documented by Wagner Gutierrez acting as a scribe for Dr. Vladimir Bennett on 01/18/2022\par \par All medical record entries made by the scribe were at my, Dr. Vladimir Bennett's, direction and personally dictated by me on 01/18/2022. I have reviewed the chart and agree that the record accurately reflects my personal performance of the history, physical exam, assessment, and plan. I have also personally directed, reviewed, and agree with the discharge instructions.

## 2022-01-18 NOTE — REASON FOR VISIT
[Follow-Up] : a follow-up visit [TextBox_44] : video call - intra infertility therapy, SOB, RADS/asthma, prior left sided spontaneous PTX

## 2022-02-03 ENCOUNTER — NON-APPOINTMENT (OUTPATIENT)
Age: 37
End: 2022-02-03

## 2022-02-03 ENCOUNTER — APPOINTMENT (OUTPATIENT)
Dept: OBGYN | Facility: CLINIC | Age: 37
End: 2022-02-03
Payer: COMMERCIAL

## 2022-02-03 VITALS
WEIGHT: 176 LBS | DIASTOLIC BLOOD PRESSURE: 70 MMHG | SYSTOLIC BLOOD PRESSURE: 120 MMHG | HEIGHT: 67 IN | BODY MASS INDEX: 27.62 KG/M2

## 2022-02-03 PROCEDURE — 0502F SUBSEQUENT PRENATAL CARE: CPT

## 2022-02-03 PROCEDURE — 90471 IMMUNIZATION ADMIN: CPT

## 2022-02-03 PROCEDURE — 90715 TDAP VACCINE 7 YRS/> IM: CPT

## 2022-02-03 PROCEDURE — 81003 URINALYSIS AUTO W/O SCOPE: CPT | Mod: QW

## 2022-02-07 ENCOUNTER — APPOINTMENT (OUTPATIENT)
Dept: OBGYN | Facility: CLINIC | Age: 37
End: 2022-02-07
Payer: COMMERCIAL

## 2022-02-07 VITALS
WEIGHT: 181.56 LBS | SYSTOLIC BLOOD PRESSURE: 110 MMHG | HEIGHT: 67 IN | BODY MASS INDEX: 28.49 KG/M2 | DIASTOLIC BLOOD PRESSURE: 66 MMHG

## 2022-02-07 LAB
BILIRUB UR QL STRIP: NORMAL
CLARITY UR: NORMAL
COLLECTION METHOD: NORMAL
GLUCOSE UR-MCNC: NORMAL
HCG UR QL: 0.2 EU/DL
HGB UR QL STRIP.AUTO: NORMAL
KETONES UR-MCNC: NORMAL
LEUKOCYTE ESTERASE UR QL STRIP: NORMAL
NITRITE UR QL STRIP: NORMAL
PH UR STRIP: 6
PROT UR STRIP-MCNC: NORMAL
SP GR UR STRIP: 1.02

## 2022-02-07 PROCEDURE — 0502F SUBSEQUENT PRENATAL CARE: CPT

## 2022-02-07 PROCEDURE — 81003 URINALYSIS AUTO W/O SCOPE: CPT | Mod: QW

## 2022-02-07 PROCEDURE — 59025 FETAL NON-STRESS TEST: CPT

## 2022-02-09 ENCOUNTER — RX RENEWAL (OUTPATIENT)
Age: 37
End: 2022-02-09

## 2022-02-15 ENCOUNTER — ASOB RESULT (OUTPATIENT)
Age: 37
End: 2022-02-15

## 2022-02-15 ENCOUNTER — APPOINTMENT (OUTPATIENT)
Dept: ANTEPARTUM | Facility: CLINIC | Age: 37
End: 2022-02-15
Payer: COMMERCIAL

## 2022-02-15 ENCOUNTER — APPOINTMENT (OUTPATIENT)
Dept: OBGYN | Facility: CLINIC | Age: 37
End: 2022-02-15
Payer: COMMERCIAL

## 2022-02-15 VITALS
WEIGHT: 182.44 LBS | HEIGHT: 67 IN | BODY MASS INDEX: 28.63 KG/M2 | SYSTOLIC BLOOD PRESSURE: 100 MMHG | DIASTOLIC BLOOD PRESSURE: 60 MMHG

## 2022-02-15 PROCEDURE — 81003 URINALYSIS AUTO W/O SCOPE: CPT | Mod: QW

## 2022-02-15 PROCEDURE — 0502F SUBSEQUENT PRENATAL CARE: CPT

## 2022-02-15 PROCEDURE — 76819 FETAL BIOPHYS PROFIL W/O NST: CPT

## 2022-02-15 PROCEDURE — 76816 OB US FOLLOW-UP PER FETUS: CPT

## 2022-02-28 ENCOUNTER — NON-APPOINTMENT (OUTPATIENT)
Age: 37
End: 2022-02-28

## 2022-03-03 ENCOUNTER — APPOINTMENT (OUTPATIENT)
Dept: OBGYN | Facility: CLINIC | Age: 37
End: 2022-03-03
Payer: COMMERCIAL

## 2022-03-03 ENCOUNTER — LABORATORY RESULT (OUTPATIENT)
Age: 37
End: 2022-03-03

## 2022-03-03 VITALS
BODY MASS INDEX: 29.35 KG/M2 | DIASTOLIC BLOOD PRESSURE: 70 MMHG | WEIGHT: 187 LBS | HEIGHT: 67 IN | SYSTOLIC BLOOD PRESSURE: 116 MMHG

## 2022-03-03 PROCEDURE — 0502F SUBSEQUENT PRENATAL CARE: CPT

## 2022-03-03 PROCEDURE — 81003 URINALYSIS AUTO W/O SCOPE: CPT | Mod: QW

## 2022-03-03 PROCEDURE — 36415 COLL VENOUS BLD VENIPUNCTURE: CPT

## 2022-03-04 DIAGNOSIS — Z23 ENCOUNTER FOR IMMUNIZATION: ICD-10-CM

## 2022-03-04 DIAGNOSIS — Z34.92 ENCOUNTER FOR SUPERVISION OF NORMAL PREGNANCY, UNSPECIFIED, SECOND TRIMESTER: ICD-10-CM

## 2022-03-04 LAB — HIV1+2 AB SPEC QL IA.RAPID: NONREACTIVE

## 2022-03-07 LAB
GP B STREP DNA SPEC QL NAA+PROBE: DETECTED
GP B STREP DNA SPEC QL NAA+PROBE: NORMAL
SOURCE GBS: NORMAL
T PALLIDUM AB SER QL IA: NEGATIVE

## 2022-03-10 ENCOUNTER — APPOINTMENT (OUTPATIENT)
Dept: OBGYN | Facility: CLINIC | Age: 37
End: 2022-03-10
Payer: COMMERCIAL

## 2022-03-10 VITALS
SYSTOLIC BLOOD PRESSURE: 100 MMHG | HEIGHT: 67 IN | DIASTOLIC BLOOD PRESSURE: 60 MMHG | WEIGHT: 193 LBS | BODY MASS INDEX: 30.29 KG/M2

## 2022-03-10 PROCEDURE — 81003 URINALYSIS AUTO W/O SCOPE: CPT | Mod: QW

## 2022-03-10 PROCEDURE — 0502F SUBSEQUENT PRENATAL CARE: CPT

## 2022-03-14 ENCOUNTER — NON-APPOINTMENT (OUTPATIENT)
Age: 37
End: 2022-03-14

## 2022-03-16 ENCOUNTER — APPOINTMENT (OUTPATIENT)
Dept: OBGYN | Facility: CLINIC | Age: 37
End: 2022-03-16
Payer: COMMERCIAL

## 2022-03-16 VITALS
HEIGHT: 67 IN | DIASTOLIC BLOOD PRESSURE: 80 MMHG | BODY MASS INDEX: 29.75 KG/M2 | SYSTOLIC BLOOD PRESSURE: 120 MMHG | WEIGHT: 189.56 LBS

## 2022-03-16 PROCEDURE — 81003 URINALYSIS AUTO W/O SCOPE: CPT | Mod: QW

## 2022-03-16 PROCEDURE — 0502F SUBSEQUENT PRENATAL CARE: CPT

## 2022-03-17 ENCOUNTER — OUTPATIENT (OUTPATIENT)
Dept: OUTPATIENT SERVICES | Facility: HOSPITAL | Age: 37
LOS: 1 days | End: 2022-03-17
Payer: COMMERCIAL

## 2022-03-17 VITALS
HEIGHT: 67 IN | HEART RATE: 94 BPM | DIASTOLIC BLOOD PRESSURE: 71 MMHG | TEMPERATURE: 98 F | RESPIRATION RATE: 20 BRPM | SYSTOLIC BLOOD PRESSURE: 115 MMHG | WEIGHT: 134.48 LBS

## 2022-03-17 DIAGNOSIS — Z01.818 ENCOUNTER FOR OTHER PREPROCEDURAL EXAMINATION: ICD-10-CM

## 2022-03-17 DIAGNOSIS — Q24.0 DEXTROCARDIA: Chronic | ICD-10-CM

## 2022-03-17 DIAGNOSIS — Z87.09 PERSONAL HISTORY OF OTHER DISEASES OF THE RESPIRATORY SYSTEM: Chronic | ICD-10-CM

## 2022-03-17 DIAGNOSIS — Q33.3 AGENESIS OF LUNG: Chronic | ICD-10-CM

## 2022-03-17 LAB
ANION GAP SERPL CALC-SCNC: 13 MMOL/L — SIGNIFICANT CHANGE UP (ref 5–17)
APPEARANCE UR: ABNORMAL
BACTERIA # UR AUTO: ABNORMAL
BASOPHILS # BLD AUTO: 0 K/UL — SIGNIFICANT CHANGE UP (ref 0–0.2)
BASOPHILS NFR BLD AUTO: 0 % — SIGNIFICANT CHANGE UP (ref 0–2)
BILIRUB UR-MCNC: NEGATIVE — SIGNIFICANT CHANGE UP
BLD GP AB SCN SERPL QL: SIGNIFICANT CHANGE UP
BUN SERPL-MCNC: 7.6 MG/DL — LOW (ref 8–20)
CALCIUM SERPL-MCNC: 9.1 MG/DL — SIGNIFICANT CHANGE UP (ref 8.6–10.2)
CHLORIDE SERPL-SCNC: 103 MMOL/L — SIGNIFICANT CHANGE UP (ref 98–107)
CO2 SERPL-SCNC: 20 MMOL/L — LOW (ref 22–29)
COLOR SPEC: YELLOW — SIGNIFICANT CHANGE UP
CREAT SERPL-MCNC: 0.46 MG/DL — LOW (ref 0.5–1.3)
DIFF PNL FLD: NEGATIVE — SIGNIFICANT CHANGE UP
EGFR: 129 ML/MIN/1.73M2 — SIGNIFICANT CHANGE UP
EOSINOPHIL # BLD AUTO: 0.13 K/UL — SIGNIFICANT CHANGE UP (ref 0–0.5)
EOSINOPHIL NFR BLD AUTO: 0.9 % — SIGNIFICANT CHANGE UP (ref 0–6)
EPI CELLS # UR: SIGNIFICANT CHANGE UP
GIANT PLATELETS BLD QL SMEAR: PRESENT — SIGNIFICANT CHANGE UP
GLUCOSE SERPL-MCNC: 90 MG/DL — SIGNIFICANT CHANGE UP (ref 70–99)
GLUCOSE UR QL: NEGATIVE MG/DL — SIGNIFICANT CHANGE UP
HCT VFR BLD CALC: 35.7 % — SIGNIFICANT CHANGE UP (ref 34.5–45)
HCV RNA SPEC NAA+PROBE-LOG IU: SIGNIFICANT CHANGE UP
HCV RNA SPEC NAA+PROBE-LOG IU: SIGNIFICANT CHANGE UP LOGIU/ML
HGB BLD-MCNC: 12.1 G/DL — SIGNIFICANT CHANGE UP (ref 11.5–15.5)
KETONES UR-MCNC: NEGATIVE — SIGNIFICANT CHANGE UP
LEUKOCYTE ESTERASE UR-ACNC: ABNORMAL
LYMPHOCYTES # BLD AUTO: 1.12 K/UL — SIGNIFICANT CHANGE UP (ref 1–3.3)
LYMPHOCYTES # BLD AUTO: 7.8 % — LOW (ref 13–44)
MANUAL SMEAR VERIFICATION: SIGNIFICANT CHANGE UP
MCHC RBC-ENTMCNC: 32.5 PG — SIGNIFICANT CHANGE UP (ref 27–34)
MCHC RBC-ENTMCNC: 33.9 GM/DL — SIGNIFICANT CHANGE UP (ref 32–36)
MCV RBC AUTO: 96 FL — SIGNIFICANT CHANGE UP (ref 80–100)
MONOCYTES # BLD AUTO: 0.5 K/UL — SIGNIFICANT CHANGE UP (ref 0–0.9)
MONOCYTES NFR BLD AUTO: 3.5 % — SIGNIFICANT CHANGE UP (ref 2–14)
NEUTROPHILS # BLD AUTO: 12.59 K/UL — HIGH (ref 1.8–7.4)
NEUTROPHILS NFR BLD AUTO: 87.8 % — HIGH (ref 43–77)
NITRITE UR-MCNC: NEGATIVE — SIGNIFICANT CHANGE UP
PH UR: 6.5 — SIGNIFICANT CHANGE UP (ref 5–8)
PLAT MORPH BLD: NORMAL — SIGNIFICANT CHANGE UP
PLATELET # BLD AUTO: 208 K/UL — SIGNIFICANT CHANGE UP (ref 150–400)
POTASSIUM SERPL-MCNC: 4 MMOL/L — SIGNIFICANT CHANGE UP (ref 3.5–5.3)
POTASSIUM SERPL-SCNC: 4 MMOL/L — SIGNIFICANT CHANGE UP (ref 3.5–5.3)
PROT UR-MCNC: 15 MG/DL
RBC # BLD: 3.72 M/UL — LOW (ref 3.8–5.2)
RBC # FLD: 12.7 % — SIGNIFICANT CHANGE UP (ref 10.3–14.5)
RBC BLD AUTO: NORMAL — SIGNIFICANT CHANGE UP
RBC CASTS # UR COMP ASSIST: NEGATIVE /HPF — SIGNIFICANT CHANGE UP (ref 0–4)
SODIUM SERPL-SCNC: 136 MMOL/L — SIGNIFICANT CHANGE UP (ref 135–145)
SP GR SPEC: 1.01 — SIGNIFICANT CHANGE UP (ref 1.01–1.02)
UROBILINOGEN FLD QL: NEGATIVE MG/DL — SIGNIFICANT CHANGE UP
WBC # BLD: 14.34 K/UL — HIGH (ref 3.8–10.5)
WBC # FLD AUTO: 14.34 K/UL — HIGH (ref 3.8–10.5)
WBC UR QL: ABNORMAL /HPF (ref 0–5)

## 2022-03-17 PROCEDURE — G0463: CPT

## 2022-03-17 NOTE — OB PST NOTE - NS_PRENATALLABSOURCEGBSBACTPN_OBGYN_ALL_OB
Post-Anesthesia Evaluation and Assessment    Patient: Carloz Arevalo MRN: [id number]  SSN: [id number]    YOB: 1949  Age: 79 y.o. Sex: male       Cardiovascular Function/Vital Signs  Visit Vitals    /66 (BP 1 Location: Left arm, BP Patient Position: At rest)    Pulse 62    Temp 36.2 °C (97.2 °F)    Resp 16    Ht 5' 7\" (1.702 m)    Wt 81.6 kg (180 lb)    SpO2 100%    BMI 28.19 kg/m2       Patient is status post MAC anesthesia for Procedure(s):  COLONOSCOPY. Nausea/Vomiting: None    Postoperative hydration reviewed and adequate. Pain:  Pain Scale 1: Numeric (0 - 10) (04/27/17 0829)  Pain Intensity 1: 0 (04/27/17 5753)   Managed    Neurological Status: At baseline    Mental Status and Level of Consciousness: Alert and oriented     Pulmonary Status:   O2 Device: Room air (04/27/17 0830)   Adequate oxygenation and airway patent    Complications related to anesthesia: None    Post-anesthesia assessment completed.  No concerns    Signed By: Colten Castro CRNA     April 27, 2017
unknown

## 2022-03-22 RX ORDER — SODIUM CHLORIDE 9 MG/ML
1000 INJECTION, SOLUTION INTRAVENOUS
Refills: 0 | Status: DISCONTINUED | OUTPATIENT
Start: 2022-03-24 | End: 2022-03-24

## 2022-03-22 RX ORDER — OXYTOCIN 10 UNIT/ML
333.33 VIAL (ML) INJECTION
Qty: 20 | Refills: 0 | Status: COMPLETED | OUTPATIENT
Start: 2022-03-24 | End: 2022-03-24

## 2022-03-22 RX ORDER — CEFAZOLIN SODIUM 1 G
2000 VIAL (EA) INJECTION ONCE
Refills: 0 | Status: COMPLETED | OUTPATIENT
Start: 2022-03-24 | End: 2022-03-24

## 2022-03-22 NOTE — OB PROVIDER H&P - ASSESSMENT
Patient is a 36 year old  at 39w1d by embryo transfer date who presents to L&D for primary C/S.   Patient is a 36 year old  at 39w1d by embryo transfer date who presents to L&D for primary C/S. Cat 1 FHT.    - consent  - admission labs  - Diet: NPO  - IV hydration  - continuous toco and fetal heart monitoring until surgery  - preop antibiotics 2g ancef  - GBS pos, ppx coverage via acef    H/o of hypoplastic right lung  -will continue qvar, albuterol  -pt takes anoro ellipta at home, currently not on formulary.     Discussed with Dr. Brush

## 2022-03-22 NOTE — OB PROVIDER H&P - NSHPPHYSICALEXAM_GEN_ALL_CORE
Vital Signs Last 24 Hrs  T(C): 37.0 (24 Mar 2022 09:33), Max: 37 (24 Mar 2022 09:27)  T(F): 98.6 (24 Mar 2022 09:33), Max: 98.6 (24 Mar 2022 09:27)  HR: 105 (24 Mar 2022 10:42) (87 - 105)  BP: 124/76 (24 Mar 2022 09:36) (124/76 - 124/76)  RR: 20 (24 Mar 2022 09:27) (20 - 20)  SpO2: 100% (24 Mar 2022 10:42) (99% - 100%)    Gen: NAD  Abd: gravid, non-tender  FHT: 130 baseline, moderate variability, + accels, - decels  [place]: irritability

## 2022-03-22 NOTE — OB PROVIDER H&P - HISTORY OF PRESENT ILLNESS
Patient is a 36 year old  at 39w1d by embryo transfer date who presents to L&D for primary C/S.      YUVAL: 3/30/22   LMP: unknown      Pregnancy course:   IVF pregnancy – PGS negative    Large for gestational age – EFW 91%, AC 95% on 2/15/22   Maternal condition: Dextrocardia/hypoplastic right lung    Obhx:   G1 -      Gynhx: -fibroids, -cysts, hx of Chlamydia with tx, no abnormal PAPs    Pmhx: Dextrocardia/hypoplastic right lung, spontaneous pneumothorax in , anxiety    Pshx: denies    Meds: Albuterol,  Anoro Ellipta inhaler, Qvar Redihaler, PNV, ASA 81mg    Allergies: NKDA   Social Hx: denies x3      BMI: 28   Ultrasound: vertex, posterior on 2/15/22   EFW: 2746g, 91% on 2/15/22  Patient is a 36 year old  at 39w1d by embryo transfer date who presents to L&D for primary C/S.    Patient endorses +FM, - ctx, -lof, -VB.     YUVAL: 3/30/22   LMP: unknown      Pregnancy course:   IVF pregnancy – PGS negative    Large for gestational age – EFW 91%, AC 95% on 2/15/22   Maternal condition: Dextrocardia/hypoplastic right lung    Obhx:   G1 -      Gynhx: -fibroids, -cysts, hx of Chlamydia with tx, no abnormal PAPs    Pmhx: Dextrocardia/hypoplastic right lung, spontaneous pneumothorax in , anxiety    Pshx: denies    Meds: Albuterol,  Anoro Ellipta inhaler, Qvar Redihaler, PNV, ASA 81mg    Allergies: NKDA   Social Hx: denies x3      BMI: 28   Ultrasound: vertex, posterior on 2/15/22   EFW: 2746g, 91% on 2/15/22

## 2022-03-22 NOTE — OB PROVIDER H&P - NSOBVTERISKASSESS_OBGYN_ALL_OB_FT
Call received from pt who was asking for info re [place] and [place] [employer]  Also  present was [facility] RN/CM  SW confirmed that they are not able to lift pt up steps when using a wheelchair   Family and/or aide would need to help  [facility] RN/CM did indicate that she can assist with a request for home PT at her next PCP visit to help with strengthing as she was recently able to travel with her Nandaator  walker  CM Team  to continue to follow and assist as indicted 
OBAntePartum Assessment Completed on: 22-Mar-2022 15:02

## 2022-03-22 NOTE — OB PROVIDER H&P - NS_FHRLOC_OBGYN_ALL_OB
Per physician instructions.    If you have questions or concerns on any instructions given to you by your provider today or if you need to schedule an appointment, you can reach us at [phone].    Thank you!       RLQ

## 2022-03-23 ENCOUNTER — APPOINTMENT (OUTPATIENT)
Dept: OBGYN | Facility: CLINIC | Age: 37
End: 2022-03-23
Payer: COMMERCIAL

## 2022-03-23 ENCOUNTER — TRANSCRIPTION ENCOUNTER (OUTPATIENT)
Age: 37
End: 2022-03-23

## 2022-03-23 VITALS
BODY MASS INDEX: 29.66 KG/M2 | SYSTOLIC BLOOD PRESSURE: 115 MMHG | HEIGHT: 67 IN | WEIGHT: 189 LBS | DIASTOLIC BLOOD PRESSURE: 80 MMHG

## 2022-03-23 PROCEDURE — 81003 URINALYSIS AUTO W/O SCOPE: CPT | Mod: QW

## 2022-03-23 PROCEDURE — 0502F SUBSEQUENT PRENATAL CARE: CPT

## 2022-03-24 ENCOUNTER — TRANSCRIPTION ENCOUNTER (OUTPATIENT)
Age: 37
End: 2022-03-24

## 2022-03-24 ENCOUNTER — APPOINTMENT (OUTPATIENT)
Dept: OBGYN | Facility: HOSPITAL | Age: 37
End: 2022-03-24

## 2022-03-24 ENCOUNTER — INPATIENT (INPATIENT)
Facility: HOSPITAL | Age: 37
LOS: 1 days | Discharge: HOME | End: 2022-03-26
Attending: OBSTETRICS & GYNECOLOGY | Admitting: OBSTETRICS & GYNECOLOGY
Payer: COMMERCIAL

## 2022-03-24 VITALS
OXYGEN SATURATION: 100 % | DIASTOLIC BLOOD PRESSURE: 76 MMHG | TEMPERATURE: 99 F | RESPIRATION RATE: 20 BRPM | HEIGHT: 67 IN | SYSTOLIC BLOOD PRESSURE: 124 MMHG | WEIGHT: 188.94 LBS | HEART RATE: 100 BPM

## 2022-03-24 DIAGNOSIS — Z3A.39 39 WEEKS GESTATION OF PREGNANCY: ICD-10-CM

## 2022-03-24 DIAGNOSIS — Z87.09 PERSONAL HISTORY OF OTHER DISEASES OF THE RESPIRATORY SYSTEM: Chronic | ICD-10-CM

## 2022-03-24 LAB
BLD GP AB SCN SERPL QL: SIGNIFICANT CHANGE UP
COVID-19 SPIKE DOMAIN AB INTERP: POSITIVE
COVID-19 SPIKE DOMAIN ANTIBODY RESULT: >250 U/ML — HIGH
SARS-COV-2 IGG+IGM SERPL QL IA: >250 U/ML — HIGH
SARS-COV-2 IGG+IGM SERPL QL IA: POSITIVE
T PALLIDUM AB TITR SER: NEGATIVE — SIGNIFICANT CHANGE UP

## 2022-03-24 PROCEDURE — 59515 CESAREAN DELIVERY: CPT

## 2022-03-24 RX ORDER — DEXAMETHASONE 0.5 MG/5ML
4 ELIXIR ORAL EVERY 6 HOURS
Refills: 0 | Status: DISCONTINUED | OUTPATIENT
Start: 2022-03-24 | End: 2022-03-26

## 2022-03-24 RX ORDER — CITRIC ACID/SODIUM CITRATE 300-500 MG
30 SOLUTION, ORAL ORAL ONCE
Refills: 0 | Status: COMPLETED | OUTPATIENT
Start: 2022-03-24 | End: 2022-03-24

## 2022-03-24 RX ORDER — ALBUTEROL 90 UG/1
2 AEROSOL, METERED ORAL EVERY 6 HOURS
Refills: 0 | Status: DISCONTINUED | OUTPATIENT
Start: 2022-03-24 | End: 2022-03-26

## 2022-03-24 RX ORDER — DIPHENHYDRAMINE HCL 50 MG
25 CAPSULE ORAL EVERY 4 HOURS
Refills: 0 | Status: DISCONTINUED | OUTPATIENT
Start: 2022-03-24 | End: 2022-03-26

## 2022-03-24 RX ORDER — ACETAMINOPHEN 500 MG
1000 TABLET ORAL ONCE
Refills: 0 | Status: COMPLETED | OUTPATIENT
Start: 2022-03-24 | End: 2022-03-24

## 2022-03-24 RX ORDER — OXYTOCIN 10 UNIT/ML
333.33 VIAL (ML) INJECTION
Qty: 20 | Refills: 0 | Status: DISCONTINUED | OUTPATIENT
Start: 2022-03-24 | End: 2022-03-26

## 2022-03-24 RX ORDER — SODIUM CHLORIDE 9 MG/ML
1000 INJECTION, SOLUTION INTRAVENOUS
Refills: 0 | Status: DISCONTINUED | OUTPATIENT
Start: 2022-03-24 | End: 2022-03-26

## 2022-03-24 RX ORDER — IBUPROFEN 200 MG
600 TABLET ORAL EVERY 6 HOURS
Refills: 0 | Status: COMPLETED | OUTPATIENT
Start: 2022-03-24 | End: 2023-02-20

## 2022-03-24 RX ORDER — OXYCODONE HYDROCHLORIDE 5 MG/1
5 TABLET ORAL
Refills: 0 | Status: COMPLETED | OUTPATIENT
Start: 2022-03-24 | End: 2022-03-31

## 2022-03-24 RX ORDER — MAGNESIUM HYDROXIDE 400 MG/1
30 TABLET, CHEWABLE ORAL
Refills: 0 | Status: DISCONTINUED | OUTPATIENT
Start: 2022-03-24 | End: 2022-03-26

## 2022-03-24 RX ORDER — MOMETASONE FUROATE 220 UG/1
2 INHALANT RESPIRATORY (INHALATION) DAILY
Refills: 0 | Status: DISCONTINUED | OUTPATIENT
Start: 2022-03-24 | End: 2022-03-26

## 2022-03-24 RX ORDER — SIMETHICONE 80 MG/1
80 TABLET, CHEWABLE ORAL EVERY 4 HOURS
Refills: 0 | Status: DISCONTINUED | OUTPATIENT
Start: 2022-03-24 | End: 2022-03-26

## 2022-03-24 RX ORDER — ENOXAPARIN SODIUM 100 MG/ML
40 INJECTION SUBCUTANEOUS EVERY 24 HOURS
Refills: 0 | Status: DISCONTINUED | OUTPATIENT
Start: 2022-03-25 | End: 2022-03-26

## 2022-03-24 RX ORDER — SODIUM CHLORIDE 9 MG/ML
1000 INJECTION, SOLUTION INTRAVENOUS ONCE
Refills: 0 | Status: COMPLETED | OUTPATIENT
Start: 2022-03-24 | End: 2022-03-24

## 2022-03-24 RX ORDER — NALBUPHINE HYDROCHLORIDE 10 MG/ML
5 INJECTION, SOLUTION INTRAMUSCULAR; INTRAVENOUS; SUBCUTANEOUS ONCE
Refills: 0 | Status: DISCONTINUED | OUTPATIENT
Start: 2022-03-24 | End: 2022-03-26

## 2022-03-24 RX ORDER — FAMOTIDINE 10 MG/ML
20 INJECTION INTRAVENOUS ONCE
Refills: 0 | Status: COMPLETED | OUTPATIENT
Start: 2022-03-24 | End: 2022-03-24

## 2022-03-24 RX ORDER — NALOXONE HYDROCHLORIDE 4 MG/.1ML
0.1 SPRAY NASAL
Refills: 0 | Status: DISCONTINUED | OUTPATIENT
Start: 2022-03-24 | End: 2022-03-26

## 2022-03-24 RX ORDER — OXYCODONE HYDROCHLORIDE 5 MG/1
5 TABLET ORAL
Refills: 0 | Status: DISCONTINUED | OUTPATIENT
Start: 2022-03-24 | End: 2022-03-26

## 2022-03-24 RX ORDER — OXYCODONE HYDROCHLORIDE 5 MG/1
5 TABLET ORAL ONCE
Refills: 0 | Status: DISCONTINUED | OUTPATIENT
Start: 2022-03-24 | End: 2022-03-26

## 2022-03-24 RX ORDER — ACETAMINOPHEN 500 MG
975 TABLET ORAL
Refills: 0 | Status: DISCONTINUED | OUTPATIENT
Start: 2022-03-24 | End: 2022-03-26

## 2022-03-24 RX ORDER — KETOROLAC TROMETHAMINE 30 MG/ML
30 SYRINGE (ML) INJECTION EVERY 6 HOURS
Refills: 0 | Status: DISCONTINUED | OUTPATIENT
Start: 2022-03-24 | End: 2022-03-25

## 2022-03-24 RX ORDER — ONDANSETRON 8 MG/1
4 TABLET, FILM COATED ORAL EVERY 6 HOURS
Refills: 0 | Status: DISCONTINUED | OUTPATIENT
Start: 2022-03-24 | End: 2022-03-26

## 2022-03-24 RX ORDER — LANOLIN
1 OINTMENT (GRAM) TOPICAL EVERY 6 HOURS
Refills: 0 | Status: DISCONTINUED | OUTPATIENT
Start: 2022-03-24 | End: 2022-03-26

## 2022-03-24 RX ORDER — TETANUS TOXOID, REDUCED DIPHTHERIA TOXOID AND ACELLULAR PERTUSSIS VACCINE, ADSORBED 5; 2.5; 8; 8; 2.5 [IU]/.5ML; [IU]/.5ML; UG/.5ML; UG/.5ML; UG/.5ML
0.5 SUSPENSION INTRAMUSCULAR ONCE
Refills: 0 | Status: DISCONTINUED | OUTPATIENT
Start: 2022-03-24 | End: 2022-03-26

## 2022-03-24 RX ORDER — DIPHENHYDRAMINE HCL 50 MG
25 CAPSULE ORAL EVERY 6 HOURS
Refills: 0 | Status: DISCONTINUED | OUTPATIENT
Start: 2022-03-24 | End: 2022-03-26

## 2022-03-24 RX ADMIN — Medication 30 MILLILITER(S): at 12:25

## 2022-03-24 RX ADMIN — OXYCODONE HYDROCHLORIDE 5 MILLIGRAM(S): 5 TABLET ORAL at 17:51

## 2022-03-24 RX ADMIN — Medication 30 MILLIGRAM(S): at 14:43

## 2022-03-24 RX ADMIN — FAMOTIDINE 20 MILLIGRAM(S): 10 INJECTION INTRAVENOUS at 12:25

## 2022-03-24 RX ADMIN — Medication 1000 MILLIUNIT(S)/MIN: at 14:37

## 2022-03-24 RX ADMIN — Medication 975 MILLIGRAM(S): at 21:08

## 2022-03-24 RX ADMIN — SODIUM CHLORIDE 125 MILLILITER(S): 9 INJECTION, SOLUTION INTRAVENOUS at 21:07

## 2022-03-24 RX ADMIN — Medication 400 MILLIGRAM(S): at 16:24

## 2022-03-24 RX ADMIN — SODIUM CHLORIDE 2000 MILLILITER(S): 9 INJECTION, SOLUTION INTRAVENOUS at 10:00

## 2022-03-24 RX ADMIN — Medication 100 MILLIGRAM(S): at 12:42

## 2022-03-24 NOTE — OB PROVIDER DELIVERY SUMMARY - NSOBVTERISKASSESS_OBGYN_ALL_OB_FT
OBPostPartum Assessment Completed on: 24-Mar-2022 13:20 Alert and oriented, no focal deficits, no motor or sensory deficits.

## 2022-03-24 NOTE — OB RN PATIENT PROFILE - DATE OF FIRST COVID-19 BOOSTER
85M PMH b/l PE (on xarelto), mitral valve prolapse, and CKD (reports baseline GFR 30) who presented to [facility] ED c/o 5d of NBNB diarrhea, dehydration, weakness, myalgias, dry cough, and worsening of his baseline tremor, and intermittent fevers (Tmax 102F at home), found to be COVID-19+, BERTRAND on CKD. Transferred to 2WO due to worsening sepsis then stepped up to 3WO 2/15 for AFib RVR, stepped down to 2WO 2/16, stabilized and further stepped down to RMF 2/18. 04-Dec-2021

## 2022-03-24 NOTE — DISCHARGE NOTE OB - PLAN OF CARE
1) Please take ibuprofen and/or Tylenol as needed for pain as prescribed.  2) Nothing in the vagina for 6 weeks (including no sex, no tampons, and no douching).  3) Please call your doctor for a follow up your postpartum appointment in 1 week.  4) Please continue taking vitamins postpartum. Take iron and colace for acute blood loss anemia.  5) Please call the office sooner if you have heavy vaginal bleeding, severe abdominal pain, or fever > 100.4F.  6) You may resume regular daily activity as tolerated

## 2022-03-24 NOTE — OB PROVIDER DELIVERY SUMMARY - NSPROVIDERDELIVERYNOTE_OBGYN_ALL_OB_FT
Patient was taken to the OR, a primary low transverse  section was performed and a viable female infant was delivered atraumatically in JANET presentation at 1254, no nuchal cord. Placenta delivered at 1255. The hysterotomy was closed with 0- monocryl in a continuous, locked fashion. A second of the same suture was used to imbricate the first. The peritoneum was closed with 0-chromic in a continuous fashion. The rectus muscles were reapproximated with 0 chromic in an interrupted and continuous fashion. The fascia was reapproximated with 0-vicryl in a continuous fashion. The subcutaneous 2-0 plain in an interrupted fashion. The skin was reapproximated with 3-0 monocryl on a anselmo needle. Excellent hemostasis was obtained at each layer of closure.  Apgars 9,9. Infant weight 3690g, 8lbs 2oz.   IVF: 1200  UOP: 200

## 2022-03-24 NOTE — OB RN PATIENT PROFILE - STEPS TO INITIATE SKIN TO SKIN CONTACT DISCUSSED, INCLUDING INITIATING FATHER SKIN TO SKIN IF POSSIBLE.
Patient feels the same, urine output is good    Mild fatigue    She still contemplating whether to proceed with dialysis    She takes additional Lasix after receiving Kyprolis for refractory myeloma    Vital signs stable    Lungs are clear    Regular heart sounds    Abdomen nontender    Mobility preserved    Neurological exam nonfocal    Blood work results just reported as pretty much unremarkable, a line was for chronic renal insufficiency and mild anemia    Impression    Good control of multiple myeloma, no changes needed    2.  On chronic anticoagulation, Eliquis    3.  Mild anemia    Return as per schedule           Electronically Signed On 09.04.2019 10:56  ___________________________________________________   Yuri Demarco MD    
Statement Selected

## 2022-03-24 NOTE — OB RN INTRAOPERATIVE NOTE - NSSELHIDDEN_OBGYN_ALL_OB_FT
[NS_DeliveryAttending1_OBGYN_ALL_OB_FT:[id number]==] [NS_DeliveryAttending1_OBGYN_ALL_OB_FT:[id number]==],[NS_DeliveryAssist1_OBGYN_ALL_OB_FT:[id number]=],[NS_DeliveryRN_OBGYN_ALL_OB_FT:[id number]=]

## 2022-03-24 NOTE — OB PROVIDER DELIVERY SUMMARY - NSSELHIDDEN_OBGYN_ALL_OB_FT
[NS_DeliveryAttending1_OBGYN_ALL_OB_FT:[id number]==],[NS_DeliveryAssist1_OBGYN_ALL_OB_FT:[id number]=]

## 2022-03-24 NOTE — OB RN PATIENT PROFILE - SPIRITUAL CULTURAL, RELIGIOUS PRACTICES/VALUES, PROFILE
Accompanied by granddaughter.  Patient's INR is subtherapeutic at 1.9.  Recent ER visit on 12/05/2019 (UTI, diarrhea) - prescribed Cefdinir 300 mg upon discharged.  Cefdinir was completed on yesterday.  Patient reported Warfarin dose missed on Tuesday, 12/10/2019.  No signs or symptoms reported.  Diarrhea - resolved.  Will boost today's Warfarin dose to 9 mg - only, then have patient to resume on his regular scheduled dose of Warfarin 5 mg every Sunday and Saturday; and 7 mg on all other days per week.  Advised patient to contact the [facility] with any medication changes in the future.  Would like to recheck patient in 3 weeks - patient requested 4 weeks.  Follow up has been scheduled for 1/10/2020.  Patient repeated back instructions and voiced understanding.  
none

## 2022-03-24 NOTE — DISCHARGE NOTE OB - CARE PROVIDER_API CALL
Elton Brush (DO)  Obstetrics and Gynecology  [address], [address] [place] [address]  Phone: [phone]  Fax: [phone]  Follow Up Time:

## 2022-03-24 NOTE — OB RN DELIVERY SUMMARY - NS_SEPSISRSKCALC_OBGYN_ALL_OB_FT
EOS calculated successfully. EOS Risk Factor: 0.5/1000 live births ([facility] national incidence); GA=39w1d; Temp=98.6; ROM=0; GBS='Positive'; Antibiotics='No antibiotics or any antibiotics < 2 hrs prior to birth'

## 2022-03-24 NOTE — DISCHARGE NOTE OB - NS MD DC FALL RISK RISK
For information on Fall & Injury Prevention, visit: [email or web address] OR  [email or web address] OR  https://www.cdc.gov/steadi/patient.html

## 2022-03-24 NOTE — DISCHARGE NOTE OB - PATIENT PORTAL LINK FT
You can access the FollowMyHealth Patient Portal offered by [facility] by registering at the following website: [email or web address]. By joining [facility]’s FollowMyHealth portal, you will also be able to view your health information using other applications (apps) compatible with our system.

## 2022-03-25 ENCOUNTER — NON-APPOINTMENT (OUTPATIENT)
Age: 37
End: 2022-03-25

## 2022-03-25 DIAGNOSIS — Z34.93 ENCOUNTER FOR SUPERVISION OF NORMAL PREGNANCY, UNSPECIFIED, THIRD TRIMESTER: ICD-10-CM

## 2022-03-25 DIAGNOSIS — O35.8XX0 MATERNAL CARE FOR OTHER (SUSPECTED) FETAL ABNORMALITY AND DAMAGE, NOT APPLICABLE OR UNSPECIFIED: ICD-10-CM

## 2022-03-25 DIAGNOSIS — O09.811 SUPERVISION OF PREGNANCY RESULTING FROM ASSISTED REPRODUCTIVE TECHNOLOGY, FIRST TRIMESTER: ICD-10-CM

## 2022-03-25 LAB
BASOPHILS # BLD AUTO: 0.05 K/UL — SIGNIFICANT CHANGE UP (ref 0–0.2)
BASOPHILS NFR BLD AUTO: 0.3 % — SIGNIFICANT CHANGE UP (ref 0–2)
EOSINOPHIL # BLD AUTO: 0.03 K/UL — SIGNIFICANT CHANGE UP (ref 0–0.5)
EOSINOPHIL NFR BLD AUTO: 0.2 % — SIGNIFICANT CHANGE UP (ref 0–6)
HCT VFR BLD CALC: 27.7 % — LOW (ref 34.5–45)
HGB BLD-MCNC: 9.4 G/DL — LOW (ref 11.5–15.5)
IMM GRANULOCYTES NFR BLD AUTO: 1.5 % — SIGNIFICANT CHANGE UP (ref 0–1.5)
LYMPHOCYTES # BLD AUTO: 11.9 % — LOW (ref 13–44)
LYMPHOCYTES # BLD AUTO: 2.17 K/UL — SIGNIFICANT CHANGE UP (ref 1–3.3)
MCHC RBC-ENTMCNC: 32.4 PG — SIGNIFICANT CHANGE UP (ref 27–34)
MCHC RBC-ENTMCNC: 33.9 GM/DL — SIGNIFICANT CHANGE UP (ref 32–36)
MCV RBC AUTO: 95.5 FL — SIGNIFICANT CHANGE UP (ref 80–100)
MONOCYTES # BLD AUTO: 1.06 K/UL — HIGH (ref 0–0.9)
MONOCYTES NFR BLD AUTO: 5.8 % — SIGNIFICANT CHANGE UP (ref 2–14)
NEUTROPHILS # BLD AUTO: 14.72 K/UL — HIGH (ref 1.8–7.4)
NEUTROPHILS NFR BLD AUTO: 80.3 % — HIGH (ref 43–77)
PLATELET # BLD AUTO: 185 K/UL — SIGNIFICANT CHANGE UP (ref 150–400)
RBC # BLD: 2.9 M/UL — LOW (ref 3.8–5.2)
RBC # FLD: 12.3 % — SIGNIFICANT CHANGE UP (ref 10.3–14.5)
WBC # BLD: 18.3 K/UL — HIGH (ref 3.8–10.5)
WBC # FLD AUTO: 18.3 K/UL — HIGH (ref 3.8–10.5)

## 2022-03-25 RX ORDER — IBUPROFEN 200 MG
600 TABLET ORAL EVERY 6 HOURS
Refills: 0 | Status: DISCONTINUED | OUTPATIENT
Start: 2022-03-25 | End: 2022-03-26

## 2022-03-25 RX ADMIN — Medication 975 MILLIGRAM(S): at 09:35

## 2022-03-25 RX ADMIN — Medication 600 MILLIGRAM(S): at 23:31

## 2022-03-25 RX ADMIN — Medication 30 MILLIGRAM(S): at 12:40

## 2022-03-25 RX ADMIN — ENOXAPARIN SODIUM 40 MILLIGRAM(S): 100 INJECTION SUBCUTANEOUS at 01:22

## 2022-03-25 RX ADMIN — Medication 975 MILLIGRAM(S): at 15:35

## 2022-03-25 RX ADMIN — Medication 975 MILLIGRAM(S): at 04:08

## 2022-03-25 RX ADMIN — Medication 30 MILLIGRAM(S): at 00:35

## 2022-03-25 RX ADMIN — Medication 975 MILLIGRAM(S): at 09:03

## 2022-03-25 RX ADMIN — Medication 600 MILLIGRAM(S): at 18:15

## 2022-03-25 RX ADMIN — Medication 975 MILLIGRAM(S): at 21:35

## 2022-03-25 RX ADMIN — Medication 30 MILLIGRAM(S): at 06:11

## 2022-03-25 RX ADMIN — Medication 600 MILLIGRAM(S): at 18:45

## 2022-03-25 RX ADMIN — Medication 975 MILLIGRAM(S): at 15:04

## 2022-03-25 RX ADMIN — Medication 30 MILLIGRAM(S): at 12:25

## 2022-03-25 NOTE — PROGRESS NOTE ADULT - SUBJECTIVE AND OBJECTIVE BOX
MOOK HORTON is a 36y  now POD#1 s/p primary  section at 39w1d gestation, uncomplicated.    S:    No acute events overnight.   The patient has no complaints.  Pain controlled with current treatment regimen.   She is ambulating without difficulty and tolerating PO.   + flatus/-BM/+ voiding   She endorses appropriate lochia, which is decreasing.   She is breastfeeding without difficulty.   She denies fevers, chills, nausea and vomiting.   She denies lightheadedness, dizziness, palpitations, chest pain and SOB.     O:    T(C): 36.9 (22 @ 19:49), Max: 37 (22 @ 09:27)  HR: 73 (-- @ 19:49) (67 - 105)  BP: 111/69 (-24-22 @ 19:49) (100/73 - 124/76)  RR: 18 (--22 @ 19:49) (14 - 21)  SpO2: 98% (22 @ 19:49) (98% - 100%)    Gen: NAD, AOx3  CV: RRR, S1/S2 present  Pulm: CTAB   Abdomen:  Soft, non-tender, non-distended, +bowel sounds  Incision: Clean/dry/intact. Pressure dressing removed this AM.  Uterus:  Fundus firm below umbilicus  VE:  Expected lochia  Ext:  b/l LE non-tender                MOOK HORTON is a 36y  now POD#1 s/p primary  section at 39w1d gestation, uncomplicated.    S:    No acute events overnight.   The patient has no complaints.  Pain controlled with current treatment regimen.   She is ambulating without difficulty and tolerating PO.   + flatus/-BM/- voiding   She endorses appropriate lochia, which is decreasing.   She is breastfeeding without difficulty.   She denies fevers, chills, nausea and vomiting.   She denies lightheadedness, dizziness, palpitations, chest pain and SOB.     O:    T(C): 36.9 (22 @ 19:49), Max: 37 (22 @ 09:27)  HR: 73 (-- @ 19:49) (67 - 105)  BP: 111/69 (-24-22 @ 19:49) (100/73 - 124/76)  RR: 18 (--22 @ 19:49) (14 - 21)  SpO2: 98% (22 @ 19:49) (98% - 100%)    Gen: NAD, AOx3  CV: RRR, S1/S2 present  Pulm: CTAB   Abdomen:  Soft, non-tender, non-distended, +bowel sounds  Incision: Clean/dry/intact. Pressure dressing removed this AM.  Uterus:  Fundus firm below umbilicus  VE:  Expected lochia  Ext:  b/l LE non-tender

## 2022-03-25 NOTE — PROGRESS NOTE ADULT - ATTENDING COMMENTS
36y  now POD#1 s/p primary  section at 39w1d gestation. Pain control s/p bustillo d/c and has not yet voided. ofelia diet, + flatus  mild lochia. Denies any SOB  VSS  Incision c/d/i with steri strips  POD#1 - doing well  - f/u void  - pain control   - encourage ambulation  - routine PO care    Donna Rene MD

## 2022-03-25 NOTE — PROGRESS NOTE ADULT - ASSESSMENT
A/P:  MOOK HORTON is a 36y  now POD#1 s/p primary  section at 39w1d gestation, uncomplicated.  -Vital signs stable  -Hgb: 12.1 -> AM labs pending   -Voiding, tolerating PO  -Advance care as tolerated   -Continue routine postpartum and postoperative care and education  -Healthy female infant  -DVT ppx: SCDs in place, lovenox  -Dispo: Anticipate discharge tomorrow pending attending approval.   A/P:  MOOK HORTON is a 36y  now POD#1 s/p primary  section at 39w1d gestation, uncomplicated.  -Vital signs stable  -Hgb: 12.1 -> AM labs pending   -FU TOV  -Tolerating PO  -Advance care as tolerated   -Continue routine postpartum and postoperative care and education  -Healthy female infant  -DVT ppx: SCDs in place, lovenox  -Dispo: Anticipate discharge tomorrow pending attending approval.

## 2022-03-26 VITALS
TEMPERATURE: 98 F | SYSTOLIC BLOOD PRESSURE: 111 MMHG | HEART RATE: 80 BPM | RESPIRATION RATE: 16 BRPM | DIASTOLIC BLOOD PRESSURE: 62 MMHG | OXYGEN SATURATION: 98 %

## 2022-03-26 PROCEDURE — 36415 COLL VENOUS BLD VENIPUNCTURE: CPT

## 2022-03-26 PROCEDURE — 85025 COMPLETE CBC W/AUTO DIFF WBC: CPT

## 2022-03-26 PROCEDURE — 86901 BLOOD TYPING SEROLOGIC RH(D): CPT

## 2022-03-26 PROCEDURE — 86769 SARS-COV-2 COVID-19 ANTIBODY: CPT

## 2022-03-26 PROCEDURE — 86780 TREPONEMA PALLIDUM: CPT

## 2022-03-26 PROCEDURE — 86850 RBC ANTIBODY SCREEN: CPT

## 2022-03-26 PROCEDURE — 86900 BLOOD TYPING SEROLOGIC ABO: CPT

## 2022-03-26 RX ORDER — IBUPROFEN 200 MG
1 TABLET ORAL
Qty: 28 | Refills: 0
Start: 2022-03-26 | End: 2022-04-01

## 2022-03-26 RX ORDER — UMECLIDINIUM BROMIDE AND VILANTEROL TRIFENATATE 62.5; 25 UG/1; UG/1
0 POWDER RESPIRATORY (INHALATION)
Qty: 0 | Refills: 0 | DISCHARGE

## 2022-03-26 RX ORDER — ACETAMINOPHEN 500 MG
3 TABLET ORAL
Qty: 36 | Refills: 0
Start: 2022-03-26 | End: 2022-03-28

## 2022-03-26 RX ORDER — BECLOMETHASONE DIPROPIONATE 40 UG/1
0 AEROSOL, METERED RESPIRATORY (INHALATION)
Qty: 0 | Refills: 0 | DISCHARGE

## 2022-03-26 RX ADMIN — Medication 600 MILLIGRAM(S): at 08:07

## 2022-03-26 RX ADMIN — Medication 600 MILLIGRAM(S): at 12:54

## 2022-03-26 RX ADMIN — ENOXAPARIN SODIUM 40 MILLIGRAM(S): 100 INJECTION SUBCUTANEOUS at 02:50

## 2022-03-26 RX ADMIN — Medication 600 MILLIGRAM(S): at 11:56

## 2022-03-26 RX ADMIN — Medication 600 MILLIGRAM(S): at 06:18

## 2022-03-26 RX ADMIN — Medication 975 MILLIGRAM(S): at 02:50

## 2022-03-26 NOTE — PROGRESS NOTE ADULT - SUBJECTIVE AND OBJECTIVE BOX
MOOK HORTON is a 36y  now POD#2 s/p primary  section at 39w1d gestation, uncomplicated.    S:    No acute events overnight.   The patient has no complaints.  Pain controlled with current treatment regimen.   She is ambulating without difficulty and tolerating PO.   + flatus/-BM/+ voiding   She endorses appropriate lochia, which is decreasing.   She is breastfeeding without difficulty.   She denies fevers, chills, nausea and vomiting.   She denies lightheadedness, dizziness, palpitations, chest pain and SOB.     O:    Vital Signs Last 24 Hrs  T(C): 37.1 (25 Mar 2022 11:38), Max: 37.1 (25 Mar 2022 11:38)  T(F): 98.7 (25 Mar 2022 11:38), Max: 98.7 (25 Mar 2022 11:38)  HR: 84 (25 Mar 2022 11:38) (84 - 84)  BP: 110/78 (25 Mar 2022 11:38) (100/63 - 110/78)  RR: 18 (25 Mar 2022 11:38) (18 - 18)  SpO2: 97% (25 Mar 2022 11:38) (96% - 97%)    Gen: NAD, AOx3  CV: RRR, S1/S2 present  Pulm: CTAB   Abdomen:  Soft, non-tender, non-distended, +bowel sounds  Incision: Clean/dry/intact.   Uterus:  Fundus firm below umbilicus  VE:  Expected lochia  Ext:  b/l LE non-tender                           9.4    18.30 )-----------( 185      ( 25 Mar 2022 07:11 )             27.7

## 2022-03-26 NOTE — PROGRESS NOTE ADULT - ASSESSMENT
A/P:  MOOK HORTON is a 36y  now POD#2 s/p primary  section at 39w1d gestation, uncomplicated.  -Vital signs stable  -Hgb: 12.1 -> 9.4  Spontaneously voiding  -Tolerating PO  -Advance care as tolerated   -Continue routine postpartum and postoperative care and education  -Healthy female infant  -DVT ppx: SCDs in place, lovenox  -Dispo: Anticipate discharge today pending attending approval.

## 2022-03-29 PROBLEM — Q33.3: Chronic | Status: ACTIVE | Noted: 2022-03-17

## 2022-03-29 PROBLEM — Q24.0 DEXTROCARDIA: Chronic | Status: ACTIVE | Noted: 2022-03-17

## 2022-04-07 ENCOUNTER — APPOINTMENT (OUTPATIENT)
Dept: OBGYN | Facility: CLINIC | Age: 37
End: 2022-04-07
Payer: COMMERCIAL

## 2022-04-07 VITALS
HEIGHT: 67 IN | BODY MASS INDEX: 26.06 KG/M2 | SYSTOLIC BLOOD PRESSURE: 116 MMHG | WEIGHT: 166 LBS | DIASTOLIC BLOOD PRESSURE: 78 MMHG

## 2022-04-07 PROCEDURE — 0503F POSTPARTUM CARE VISIT: CPT

## 2022-04-07 NOTE — HISTORY OF PRESENT ILLNESS
[Postpartum Follow Up] : postpartum follow up [Complications:___] : no complications [Primary C/S] : delivered by  section [Breastfeeding] : currently nursing [S/Sx PP Depression] : no signs/symptoms of postpartum depression [Clean/Dry/Intact] : clean, dry and intact [Erythema] : not erythematous [Doing Well] : is doing well [No Sign of Infection] : is showing no signs of infection [Excellent Pain Control] : has excellent pain control [None] : None

## 2022-04-08 ENCOUNTER — NON-APPOINTMENT (OUTPATIENT)
Age: 37
End: 2022-04-08

## 2022-04-29 ENCOUNTER — NON-APPOINTMENT (OUTPATIENT)
Age: 37
End: 2022-04-29

## 2022-05-11 ENCOUNTER — APPOINTMENT (OUTPATIENT)
Dept: OBGYN | Facility: CLINIC | Age: 37
End: 2022-05-11
Payer: COMMERCIAL

## 2022-05-11 VITALS
WEIGHT: 163.19 LBS | SYSTOLIC BLOOD PRESSURE: 100 MMHG | HEIGHT: 67 IN | DIASTOLIC BLOOD PRESSURE: 70 MMHG | BODY MASS INDEX: 25.61 KG/M2

## 2022-05-11 PROCEDURE — 0503F POSTPARTUM CARE VISIT: CPT

## 2022-05-11 NOTE — HISTORY OF PRESENT ILLNESS
[Postpartum Follow Up] : postpartum follow up [Primary C/S] : delivered by  section [Breastfeeding] : currently nursing [Clean/Dry/Intact] : clean, dry and intact [Back to Normal] : is back to normal in size [Mild] : mild vaginal bleeding [Normal] : the vagina was normal [Examination Of The Breasts] : breasts are normal [Doing Well] : is doing well [No Sign of Infection] : is showing no signs of infection [Excellent Pain Control] : has excellent pain control [None] : None [Complications:___] : no complications [S/Sx PP Depression] : no signs/symptoms of postpartum depression [Erythema] : not erythematous [Cervix Sample Taken] : cervical sample not taken for a Pap smear

## 2022-05-17 ENCOUNTER — RX RENEWAL (OUTPATIENT)
Age: 37
End: 2022-05-17

## 2022-06-06 ENCOUNTER — APPOINTMENT (OUTPATIENT)
Dept: PULMONOLOGY | Facility: CLINIC | Age: 37
End: 2022-06-06

## 2022-07-13 ENCOUNTER — APPOINTMENT (OUTPATIENT)
Dept: PULMONOLOGY | Facility: CLINIC | Age: 37
End: 2022-07-13

## 2022-07-13 ENCOUNTER — TRANSCRIPTION ENCOUNTER (OUTPATIENT)
Age: 37
End: 2022-07-13

## 2022-07-13 VITALS — HEIGHT: 67 IN | WEIGHT: 160 LBS | BODY MASS INDEX: 25.11 KG/M2

## 2022-07-13 PROCEDURE — 99214 OFFICE O/P EST MOD 30 MIN: CPT | Mod: 95

## 2022-07-13 RX ORDER — AMMONIUM LACTATE 12 %
12 CREAM (GRAM) TOPICAL
Qty: 280 | Refills: 0 | Status: ACTIVE | COMMUNITY
Start: 2022-06-02

## 2022-07-13 NOTE — ASSESSMENT
[FreeTextEntry1] : Ms. HORTON is a 36 year female with a history of dextrocardia, pneumothorax on the left lung (2015)), VATS pleurodesis (2015), reactive airways growing up (?asthma), s/p IVF for infertility, who now comes in via video call for a follow up  pulmonary evaluation for infertility therapy, SOB, RADS/asthma, prior left sided spontaneous PTX - stable; #1 issue is COVID-19 7/2022\par \par The patient's SOB is felt to be multifactorial:\par -poor mechanics of breathing\par -out of shape\par -Pulmonary\par  -Hx of spontaneous PTX\par  -volume loss (c/w restrictive lung disease)\par  -RADS/asthma\par  -anemia\par -Cardiac\par  -dextrocardia (Daniel Goldberg)\par \par Problem 1: RADS/asthma\par -continue Bevespi 2 puffs BID followed by Anoro at 1 inhalation QD followed by Qvar Redihaler 80 2 puffs BID \par -continue ProAir 2 puffs Q6H, pre-exercise\par - Inhaler technique reviewed as well as oral hygiene technique reviewed with patient. Avoidance of cold air, extremes of temperature, rescue inhaler should be used before exercise. Order of medication reviewed with patient. Recommended use of a cool mist humidifier in the bedroom. \par - Asthma is believed to be caused by inherited (genetic) and environmental factor, but its exact cause is unknown. asthma may be triggered by allergens, lung infections, or irritants in the air. Asthma triggers are different for each person \par \par Problem 1A: COVID-19 7/2022\par - MAB \par - [place] Cold and Flu \par - s/p COVID-19 vax x3 \par - no Paxlovid \par \par Problem 2: Allergies (NC)\par -get Blood work to include: asthma panel, food IgE panel, IgE level, eosinophil level, vitamin D level (NC)\par -Environmental measures for allergies were encouraged including mattress and pillow cover, air purifier, and environmental controls. \par \par Problem 3: Anemia\par -Complete iron studies (NC)\par \par Problem 4: Hx of pneumothorax s/p pleurodesis- Agenesis left lung \par -appears to be quiescent\par The patient has a pneumothorax from trauma, iatrogenic, post surgical. You are at risk for one in the future and may need hospitalization. Thoracic surgical evaluation is needed for chest tube and other intervention. \par \par \par Problem 5:Cardiac\par -Recommend cardiac follow up evaluation with cardiologist Dr. Joel Goldberg if needed \par \par Problem 6:overweight/out of shape\par -Recommend "Muniq" OTC for weight loss, energy, and blood sugar \par -Recommended to limit salt / calories \par - Weight loss, exercise and diet control were discussed and are highly encouraged. Treatment options were given such as aqua therapy, and contacting a nutritionist. Recommended to use the elliptical, stationary bike, less use of treadmill. Mindful eating was explained to the patient. Obesity is associated with worsening asthma, SOB, and potential for cardiac disease, diabetes, and other underlying medical conditions.\par \par Problem 7: Poor mechanics of breathing\par - Recommended "The Gift of Maybe" by Susie Ba for anxiety\par -Recommend Wim Kleberf and [place] breathing techniques \par - Proper breathing techniques were reviewed with an emphasis on exhalation. Patient instructed to breath in for 1 second and out for four seconds. Patient was encouraged not to talk while walking.\par \par Problem 8: Health Maintenance\par -s/p COVID-19 vaccine x3 2/2021\par -s/p flu shot 2021 \par -recommended strep pneumonia vaccines: Prevnar-13 vaccine, follow by Pneumo vaccine 23 one year following\par -recommended early intervention for URIs\par -recommended regular osteoporosis evaluations\par -recommended early dermatological evaluations\par -recommended after the age of 50 to the age of 70, colonoscopy every 5 years\par \par f/u in 6-8 weeks\par pt is encouraged to call or fax the office with any questions or concerns.\par

## 2022-07-13 NOTE — ADDENDUM
[FreeTextEntry1] : Documented by Edwin Gaffney acting as a scribe for Dr. Vladimir Bennett on (07/13/2022).\par \par All medical record record entries made by the Scribe were at my, Dr. Vladimir Bennett's, direction and personally dictated by me on (07/13/2022). I have reviewed the chart and agree that the record accurately reflects my personal performance of the history, physical exam, assessment and plan. I have personally directed, reviewed, and agree with the discharge instructions.

## 2022-07-13 NOTE — REASON FOR VISIT
[Follow-Up] : a follow-up visit [TextBox_44] : video call - COVID-197/2022, SOB, RADS/asthma, prior left sided spontaneous PTX, agenesis left lung

## 2022-07-15 ENCOUNTER — APPOINTMENT (OUTPATIENT)
Dept: DISASTER EMERGENCY | Facility: HOSPITAL | Age: 37
End: 2022-07-15

## 2022-07-15 ENCOUNTER — OUTPATIENT (OUTPATIENT)
Dept: OUTPATIENT SERVICES | Facility: HOSPITAL | Age: 37
LOS: 1 days | End: 2022-07-15

## 2022-07-15 VITALS
OXYGEN SATURATION: 98 % | TEMPERATURE: 99 F | RESPIRATION RATE: 16 BRPM | HEART RATE: 76 BPM | SYSTOLIC BLOOD PRESSURE: 108 MMHG | DIASTOLIC BLOOD PRESSURE: 70 MMHG

## 2022-07-15 VITALS
OXYGEN SATURATION: 97 % | HEIGHT: 67 IN | HEART RATE: 77 BPM | RESPIRATION RATE: 17 BRPM | SYSTOLIC BLOOD PRESSURE: 111 MMHG | WEIGHT: 160.28 LBS | TEMPERATURE: 99 F | DIASTOLIC BLOOD PRESSURE: 73 MMHG

## 2022-07-15 DIAGNOSIS — U07.1 COVID-19: ICD-10-CM

## 2022-07-15 DIAGNOSIS — Z87.09 PERSONAL HISTORY OF OTHER DISEASES OF THE RESPIRATORY SYSTEM: Chronic | ICD-10-CM

## 2022-07-15 RX ORDER — BEBTELOVIMAB 87.5 MG/ML
175 INJECTION, SOLUTION INTRAVENOUS ONCE
Refills: 0 | Status: COMPLETED | OUTPATIENT
Start: 2022-07-15 | End: 2022-07-15

## 2022-07-15 RX ADMIN — BEBTELOVIMAB 175 MILLIGRAM(S): 87.5 INJECTION, SOLUTION INTRAVENOUS at 14:55

## 2022-07-15 NOTE — CHART NOTE - NSCHARTNOTEFT_GEN_A_CORE
CC: Monoclonal Antibody Infusion/COVID 19 Positive    35 YO female with PMH reactive airway disease s/p VATs, asthma, and recent dx of COVID 19 who presents today for elective Bebtelovimab. Pt tested positive on 7/11 and has had symptoms since 7/10. Patient has been screened and was deemed to be a candidate.      Symptoms/Criteria: sore throat, cough  Risk Profile: reactive airway disease      Vaccination Status: x3      Vitals: SEE FLOWSHEET      PE:   Appearance: NAD	  HEENT:  NC/AT  Cardiovascular:  No edema  Respiratory: no use of accessory muscles  Gastrointestinal:  non-distended   Skin: warm and dry  Neurologic: Non-focal  Extremities: Normal range of motion      ASSESSMENT:  Pt is COVID positive with mild to moderate symptoms who was referred for elective MAB (Bebtelovimab).      PLAN:  - MAB treatment explained to patient. I have reviewed the Bebtelovimab Emergency Use Authorization (EUA) and I have provided the patient or patient's caregiver with the following information:     1. FDA has authorized emergency use of Bebtelovimab to be administered for the treatment of mild to moderate COVID-19, which is not an FDA-approved biological product.   2. The patient or patient's caregiver has the option to accept or refuse administration of MAB.   3. The significant known and potential risks and benefits of Bebtelovimab and the extent to which such risks and benefits are unknown.  4. Information on available alternative treatments and risks and benefits of those alternatives.    - Patient verbalized understanding of plan and agrees to treatment. All questions answered.  - Consent for MAB obtained.   - 175mg of Bebtelovimab administered as a single intravenous injection over at least 30 seconds.   - Observe patient for one hour post medication administration and then if stable, discharge home with outpatient follow up as planned by PCP.      POST ASSESSMENT:   Patient completed MAB, and monitored x 1 hour post-infusion with no adverse reactions noted, remained hemodynamically stable.  - Patient tolerated infusion well; denies complaints of chest pain/SOB/dizziness/palpitations.   - VSS for discharge home.  - D/C instructions given/ fact sheet included.  - Patient was instructed to self-isolate and use infection control measures (e.g wear mask, isolate, social distance, avoid sharing personal items, clean and disinfect "high touch" surfaces, and frequent handwashing according to the CDC guidelines.   - The patient was informed on what symptoms to be aware of for the next couple of days, and if there are any issues to call the 24/7 clinical call center. Patient was instructed to follow up with primary care provider as needed.      DISCHARGE at __________ CC: Monoclonal Antibody Infusion/COVID 19 Positive    37 YO female with PMH reactive airway disease s/p VATs, asthma, and recent dx of COVID 19 who presents today for elective Bebtelovimab. Pt tested positive on 7/11 and has had symptoms since 7/10. Patient has been screened and was deemed to be a candidate.      Symptoms/Criteria: sore throat, cough  Risk Profile: reactive airway disease      Vaccination Status: x3      Vitals: SEE FLOWSHEET      PE:   Appearance: NAD	  HEENT:  NC/AT  Cardiovascular:  No edema  Respiratory: no use of accessory muscles  Gastrointestinal:  non-distended   Skin: warm and dry  Neurologic: Non-focal  Extremities: Normal range of motion      ASSESSMENT:  Pt is COVID positive with mild to moderate symptoms who was referred for elective MAB (Bebtelovimab).      PLAN:  - MAB treatment explained to patient. I have reviewed the Bebtelovimab Emergency Use Authorization (EUA) and I have provided the patient or patient's caregiver with the following information:     1. FDA has authorized emergency use of Bebtelovimab to be administered for the treatment of mild to moderate COVID-19, which is not an FDA-approved biological product.   2. The patient or patient's caregiver has the option to accept or refuse administration of MAB.   3. The significant known and potential risks and benefits of Bebtelovimab and the extent to which such risks and benefits are unknown.  4. Information on available alternative treatments and risks and benefits of those alternatives.    - Patient verbalized understanding of plan and agrees to treatment. All questions answered.  - Consent for MAB obtained.   - 175mg of Bebtelovimab administered as a single intravenous injection over at least 30 seconds.   - Observe patient for one hour post medication administration and then if stable, discharge home with outpatient follow up as planned by PCP.      POST ASSESSMENT:   Patient completed MAB, and monitored x 1 hour post-infusion with no adverse reactions noted, remained hemodynamically stable.  - Patient tolerated infusion well; denies complaints of chest pain/SOB/dizziness/palpitations.   - VSS for discharge home.  - D/C instructions given/ fact sheet included.  - Patient was instructed to self-isolate and use infection control measures (e.g wear mask, isolate, social distance, avoid sharing personal items, clean and disinfect "high touch" surfaces, and frequent handwashing according to the CDC guidelines.   - The patient was informed on what symptoms to be aware of for the next couple of days, and if there are any issues to call the 24/7 clinical call center. Patient was instructed to follow up with primary care provider as needed.      DISCHARGE at 1555.

## 2022-09-12 ENCOUNTER — APPOINTMENT (OUTPATIENT)
Dept: OBGYN | Facility: CLINIC | Age: 37
End: 2022-09-12

## 2022-09-12 VITALS
DIASTOLIC BLOOD PRESSURE: 72 MMHG | BODY MASS INDEX: 25.11 KG/M2 | HEIGHT: 67 IN | SYSTOLIC BLOOD PRESSURE: 120 MMHG | WEIGHT: 160 LBS

## 2022-09-12 PROCEDURE — 99395 PREV VISIT EST AGE 18-39: CPT

## 2022-09-12 NOTE — PHYSICAL EXAM
[Appropriately responsive] : appropriately responsive [Alert] : alert [No Acute Distress] : no acute distress [No Lymphadenopathy] : no lymphadenopathy [Regular Rate Rhythm] : regular rate rhythm [No Murmurs] : no murmurs [Clear to Auscultation B/L] : clear to auscultation bilaterally [Soft] : soft [Non-tender] : non-tender [Non-distended] : non-distended [No HSM] : No HSM [No Lesions] : no lesions [No Mass] : no mass [Oriented x3] : oriented x3 [Examination Of The Breasts] : a normal appearance [No Masses] : no breast masses were palpable [Labia Majora] : normal [Labia Minora] : normal [Normal] : normal [Uterine Adnexae] : normal [FreeTextEntry5] : absent right lung

## 2022-09-12 NOTE — PLAN
[FreeTextEntry1] : Unremarkable CBE and pelvic exam\par Pap and HPV collected\par Healthy diet, exercise, sleep hygiene discussed\par No other gyn concerns today\par RTO x 1 year or prn\par

## 2022-09-12 NOTE — HISTORY OF PRESENT ILLNESS
[Patient reported PAP Smear was normal] : Patient reported PAP Smear was normal [HIV test declined] : HIV test declined [Syphilis test declined] : Syphilis test declined [Gonorrhea test declined] : Gonorrhea test declined [Chlamydia test declined] : Chlamydia test declined [Trichomonas test declined] : Trichomonas test declined [HPV test offered] : HPV test offered [Y] : Positive pregnancy history [TextBox_4] : 38 yo  for annual exam [PapSmeardate] : 9/21 [LMPDate] : 6/21/21 [PGHxTotal] : 2 [[facility]] : 1 [[facility]] : [facility] [PGHxABSpont] : 1

## 2022-09-13 LAB — HPV HIGH+LOW RISK DNA PNL CVX: NOT DETECTED

## 2022-09-19 LAB — CYTOLOGY CVX/VAG DOC THIN PREP: NORMAL

## 2022-10-10 ENCOUNTER — APPOINTMENT (OUTPATIENT)
Dept: PULMONOLOGY | Facility: CLINIC | Age: 37
End: 2022-10-10

## 2022-10-10 VITALS
HEART RATE: 99 BPM | RESPIRATION RATE: 16 BRPM | DIASTOLIC BLOOD PRESSURE: 82 MMHG | OXYGEN SATURATION: 98 % | SYSTOLIC BLOOD PRESSURE: 120 MMHG | HEIGHT: 67 IN | WEIGHT: 160 LBS | BODY MASS INDEX: 25.11 KG/M2 | TEMPERATURE: 97.5 F

## 2022-10-10 PROCEDURE — 99214 OFFICE O/P EST MOD 30 MIN: CPT | Mod: 25

## 2022-10-10 PROCEDURE — ZZZZZ: CPT

## 2022-10-10 PROCEDURE — 94729 DIFFUSING CAPACITY: CPT

## 2022-10-10 PROCEDURE — 94727 GAS DIL/WSHOT DETER LNG VOL: CPT

## 2022-10-10 PROCEDURE — 94010 BREATHING CAPACITY TEST: CPT

## 2022-10-10 PROCEDURE — 95012 NITRIC OXIDE EXP GAS DETER: CPT

## 2022-10-10 NOTE — REASON FOR VISIT
[Follow-Up] : a follow-up visit [TextBox_44] :  COVID-197/2022, SOB, RADS/asthma, prior left sided spontaneous PTX, agenesis left lung

## 2022-10-10 NOTE — ADDENDUM
[FreeTextEntry1] : Documented by Mark Sanon acting as a scribe for Dr. Vladimir Bennett on 10/10/2022 .\par \par All medical record entries made by the Scribe were at my, Dr. Vladimir Bennett's, direction and personally dictated by me on. I have reviewed the chart and agree that the record accurately reflects my personal performance of the history, physical exam, assessment and plan. I have also personally directed, reviewed, and agree with the discharge instructions.

## 2022-10-10 NOTE — PROCEDURE
[FreeTextEntry1] : FENO was  24     ; a normal value being less than 25\par Fractional exhaled nitric oxide (FENO) is regarded as a simple, noninvasive method for assessing eosinophilic airway inflammation. Produced by a variety of cells within the lung, nitric oxide (NO) concentrations are generally low in healthy individuals. However, high concentrations of NO appear to be involved in nonspecific host defense mechanisms and chronic inflammatory diseases such as asthma. The American Thoracic Society (ATS) therefore has recommended using FENO to aid in the diagnosis and monitoring of eosinophilic airway inflammation and asthma, and for identifying steroid responsive individuals whose chronic respiratory symptoms may be caused by airway inflammation. \par \par Full PFT revealed mild obstructive dysfunction,  normal flows, with a FEV1 of 1.84L,which is 58% of predicted,  normal lung volumes, and a diffusion of 19.4, which is 78% of predicted with a normal flow volume loop

## 2022-10-10 NOTE — ASSESSMENT
[FreeTextEntry1] : Ms. HORTON is a 36 year female with a history of dextrocardia, pneumothorax on the left lung (2015)), VATS pleurodesis (2015), reactive airways growing up (?asthma), s/p IVF for infertility, who now comes in via video call for a follow up  pulmonary evaluation for infertility therapy, SOB, RADS/asthma, prior left sided spontaneous PTX - stable; #1 s/p is COVID-19 7/2022; s/p delivery -stable \par \par The patient's SOB is felt to be multifactorial:\par -poor mechanics of breathing\par -out of shape\par -Pulmonary\par  -Hx of spontaneous PTX\par  -volume loss (c/w restrictive lung disease)\par  -RADS/asthma\par  -anemia\par -Cardiac\par  -dextrocardia (Daniel Goldberg)\par \par Problem 1: RADS/asthma\par -continue Bevespi 2 puffs BID followed by Anoro at 1 inhalation QD followed by Qvar Redihaler 80 2 puffs BID \par -continue ProAir 2 puffs Q6H, pre-exercise\par - Inhaler technique reviewed as well as oral hygiene technique reviewed with patient. Avoidance of cold air, extremes of temperature, rescue inhaler should be used before exercise. Order of medication reviewed with patient. Recommended use of a cool mist humidifier in the bedroom. \par - Asthma is believed to be caused by inherited (genetic) and environmental factor, but its exact cause is unknown. asthma may be triggered by allergens, lung infections, or irritants in the air. Asthma triggers are different for each person \par \par Problem 1A: COVID-19 7/2022 (resolved) \par -Recommend "Sanotize" nasal spray \par - MAB \par - [place] Cold and Flu \par - s/p COVID-19 vax x3 \par - no Paxlovid \par \par Problem 2: Allergies (NC)\par -get Blood work to include: asthma panel, food IgE panel, IgE level, eosinophil level, vitamin D level (NC)\par -Environmental measures for allergies were encouraged including mattress and pillow cover, air purifier, and environmental controls. \par \par Problem 3: Anemia\par -Complete iron studies (NC) -s/p supplements \par \par Problem 4: Hx of pneumothorax s/p pleurodesis- Agenesis left lung \par -appears to be quiescent\par The patient has a pneumothorax from trauma, iatrogenic, post surgical. You are at risk for one in the future and may need hospitalization. Thoracic surgical evaluation is needed for chest tube and other intervention. \par \par \par Problem 5:Cardiac\par -Recommend cardiac follow up evaluation with cardiologist Dr. Joel Goldberg if needed \par \par Problem 6:overweight/out of shape\par -Recommend "Muniq" OTC for weight loss, energy, and blood sugar \par -Recommended Fernando Dari book on 10 day Detox \par -Recommended to limit salt / calories \par - Weight loss, exercise and diet control were discussed and are highly encouraged. Treatment options were given such as aqua therapy, and contacting a nutritionist. Recommended to use the elliptical, stationary bike, less use of treadmill. Mindful eating was explained to the patient. Obesity is associated with worsening asthma, SOB, and potential for cardiac disease, diabetes, and other underlying medical conditions.\par \par Problem 7: Poor mechanics of breathing\par - Recommended "The Gift of Maybe" by Susie Ba for anxiety\par -Recommend Wim Hof and [place] breathing techniques \par - Proper breathing techniques were reviewed with an emphasis on exhalation. Patient instructed to breath in for 1 second and out for four seconds. Patient was encouraged not to talk while walking.\par \par Problem 8: Health Maintenance\par -Epifanio [employer] stretches \par -s/p COVID-19 vaccine x4\par -s/p flu shot 2022 \par -recommended strep pneumonia vaccines: Prevnar-13 vaccine, follow by Pneumo vaccine 23 one year following\par -recommended early intervention for URIs\par -recommended regular osteoporosis evaluations\par -recommended early dermatological evaluations\par -recommended after the age of 50 to the age of 70, colonoscopy every 5 years\par \par f/u in 6-8 weeks\par pt is encouraged to call or fax the office with any questions or concerns.\par

## 2022-10-10 NOTE — HISTORY OF PRESENT ILLNESS
[TextBox_4] : Ms. HORTON is a 37 year female with a history of dextrocardia, pneumothorax on the left lung (2015)), VATS pleurodesis (2015), reactive airways growing up (?asthma), receiving IVF for infertility, who now comes in for a follow up pulmonary evaluation. 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

## 2022-11-25 ENCOUNTER — RX RENEWAL (OUTPATIENT)
Age: 37
End: 2022-11-25

## 2023-02-01 ENCOUNTER — APPOINTMENT (OUTPATIENT)
Dept: OBGYN | Facility: CLINIC | Age: 38
End: 2023-02-01
Payer: COMMERCIAL

## 2023-02-01 VITALS
HEIGHT: 67 IN | WEIGHT: 160 LBS | SYSTOLIC BLOOD PRESSURE: 116 MMHG | BODY MASS INDEX: 25.11 KG/M2 | DIASTOLIC BLOOD PRESSURE: 64 MMHG

## 2023-02-01 DIAGNOSIS — Z30.9 ENCOUNTER FOR CONTRACEPTIVE MANAGEMENT, UNSPECIFIED: ICD-10-CM

## 2023-02-01 PROCEDURE — 99213 OFFICE O/P EST LOW 20 MIN: CPT

## 2023-02-01 NOTE — PHYSICAL EXAM
[Appropriately responsive] : appropriately responsive [Alert] : alert [No Acute Distress] : no acute distress [No Lymphadenopathy] : no lymphadenopathy [Regular Rate Rhythm] : regular rate rhythm [Clear to Auscultation B/L] : clear to auscultation bilaterally [Oriented x3] : oriented x3

## 2023-02-01 NOTE — HISTORY OF PRESENT ILLNESS
[FreeTextEntry1] : 36yo , sexually active patient presents to discuss contraception.  She has previously been on oral contraceptives prior to infertility treatment and subsequent delivery.  No prior adverse side effects with oral contraceptives.

## 2023-02-01 NOTE — DISCUSSION/SUMMARY
[FreeTextEntry1] : Rx Lo loestrin x 1 year\par Patient to contact provider with any of the following warning signs of hormonal contraceptives:\par Abdominal pain\par CHest pain\par Headaches\par Visual Changes\par Shortness of breath\par Pain or swelling in limbs\par \par  Patient verbalizes understanding of and agreement with this plan.  All questions answered to patient's satisfaction.\par

## 2023-02-26 ENCOUNTER — APPOINTMENT (OUTPATIENT)
Dept: FAMILY MEDICINE | Facility: CLINIC | Age: 38
End: 2023-02-26
Payer: COMMERCIAL

## 2023-02-26 VITALS
WEIGHT: 160 LBS | BODY MASS INDEX: 25.11 KG/M2 | OXYGEN SATURATION: 98 % | DIASTOLIC BLOOD PRESSURE: 72 MMHG | RESPIRATION RATE: 16 BRPM | TEMPERATURE: 98.2 F | SYSTOLIC BLOOD PRESSURE: 122 MMHG | HEART RATE: 98 BPM | HEIGHT: 67 IN

## 2023-02-26 LAB
HCG UR QL: NEGATIVE
QUALITY CONTROL: YES

## 2023-02-26 PROCEDURE — 96372 THER/PROPH/DIAG INJ SC/IM: CPT

## 2023-02-26 PROCEDURE — G0444 DEPRESSION SCREEN ANNUAL: CPT | Mod: 59

## 2023-02-26 PROCEDURE — 99204 OFFICE O/P NEW MOD 45 MIN: CPT | Mod: 25

## 2023-02-26 PROCEDURE — 36415 COLL VENOUS BLD VENIPUNCTURE: CPT

## 2023-02-26 RX ORDER — UMECLIDINIUM BROMIDE AND VILANTEROL TRIFENATATE 62.5; 25 UG/1; UG/1
62.5-25 POWDER RESPIRATORY (INHALATION) DAILY
Qty: 60 | Refills: 5 | Status: DISCONTINUED | COMMUNITY
Start: 2021-05-12 | End: 2023-02-26

## 2023-02-26 RX ORDER — CYANOCOBALAMIN 1000 UG/ML
1000 INJECTION INTRAMUSCULAR; SUBCUTANEOUS
Qty: 0 | Refills: 0 | Status: COMPLETED | OUTPATIENT
Start: 2023-02-26

## 2023-02-26 RX ADMIN — CYANOCOBALAMIN 1 MCG/ML: 1000 INJECTION INTRAMUSCULAR; SUBCUTANEOUS at 00:00

## 2023-02-26 NOTE — PHYSICAL EXAM
[No Acute Distress] : no acute distress [Well Nourished] : well nourished [Well Developed] : well developed [Well-Appearing] : well-appearing [Normal Sclera/Conjunctiva] : normal sclera/conjunctiva [PERRL] : pupils equal round and reactive to light [EOMI] : extraocular movements intact [Normal Outer Ear/Nose] : the outer ears and nose were normal in appearance [Normal Oropharynx] : the oropharynx was normal [Normal TMs] : both tympanic membranes were normal [No JVD] : no jugular venous distention [No Lymphadenopathy] : no lymphadenopathy [Supple] : supple [Thyroid Normal, No Nodules] : the thyroid was normal and there were no nodules present [No Respiratory Distress] : no respiratory distress  [No Accessory Muscle Use] : no accessory muscle use [Clear to Auscultation] : lungs were clear to auscultation bilaterally [Normal Rate] : normal rate  [Regular Rhythm] : with a regular rhythm [Normal S1, S2] : normal S1 and S2 [No Murmur] : no murmur heard [Pedal Pulses Present] : the pedal pulses are present [No Edema] : there was no peripheral edema [Soft] : abdomen soft [Non Tender] : non-tender [Non-distended] : non-distended [Normal Posterior Cervical Nodes] : no posterior cervical lymphadenopathy [Normal Anterior Cervical Nodes] : no anterior cervical lymphadenopathy [No CVA Tenderness] : no CVA  tenderness [No Spinal Tenderness] : no spinal tenderness [No Joint Swelling] : no joint swelling [Grossly Normal Strength/Tone] : grossly normal strength/tone [No Rash] : no rash [Coordination Grossly Intact] : coordination grossly intact [No Focal Deficits] : no focal deficits [Deep Tendon Reflexes (DTR)] : deep tendon reflexes were 2+ and symmetric [Normal Gait] : normal gait [Normal Affect] : the affect was normal [Alert and Oriented x3] : oriented to person, place, and time [Normal Mood] : the mood was normal [Normal Insight/Judgement] : insight and judgment were intact

## 2023-02-26 NOTE — HISTORY OF PRESENT ILLNESS
[FreeTextEntry1] : establish care \par CPE \par no fasting had sausage egg and cheese on biscuit and mandrin \par NKDA \par [facility] [place] [de-identified] : Hx of one lung and dextro cardia. Hx estates for the last 6 months she has been feeling tired, run down and "hung over". \par Had one child 11 months ago. FPAL 1211. LMP Started Birth control 1 month ago. Still bleeding. Bleeding for 2 weeks. She states she is still nursing. \par

## 2023-02-26 NOTE — HEALTH RISK ASSESSMENT
[Yes] : Yes [2 - 4 times a month (2 pts)] : 2-4 times a month (2 points) [1 or 2 (0 pts)] : 1 or 2 (0 points) [Never (0 pts)] : Never (0 points) [No] : In the past 12 months have you used drugs other than those required for medical reasons? No [0] : 2) Feeling down, depressed, or hopeless: Not at all (0) [PHQ-2 Negative - No further assessment needed] : PHQ-2 Negative - No further assessment needed [Never] : Never [Audit-CScore] : 2 [[id number]] : 0

## 2023-02-27 ENCOUNTER — APPOINTMENT (OUTPATIENT)
Dept: PULMONOLOGY | Facility: CLINIC | Age: 38
End: 2023-02-27
Payer: COMMERCIAL

## 2023-02-27 PROCEDURE — 99213 OFFICE O/P EST LOW 20 MIN: CPT | Mod: 95

## 2023-02-27 NOTE — ASSESSMENT
[FreeTextEntry1] : Ms. HORTON is a 37 year female with a history of dextrocardia, pneumothorax on the left lung (2015)), VATS pleurodesis (2015), reactive airways hx vs asthma,COVID 7/2022, s/p IVF for infertility, who is here via video call for a follow up  pulmonary evaluation and med renewals. She is stable from a pulmonary standpoint. \par \par Problem 1: RADS/asthma\par -continue Anoro at 1 inhalation QD \par -Continue Qvar Redihaler 80 2 puffs BID rinse and gargle\par -continue ProAir 2 puffs Q6H, pre-exercise\par \par Problem 2: Anemia hx\par -on iron supplements\par \par Problem 3: Hx of pneumothorax s/p pleurodesis- Agenesis left lung \par -appears to be quiescent\par The patient has a pneumothorax from trauma, iatrogenic, post surgical. You are at risk for one in the future and may need hospitalization. Thoracic surgical evaluation is needed for chest tube and other intervention. \par \par f/u in 6 months with SPI/NIOX\par pt is encouraged to call or fax the office with any questions or concerns.\par

## 2023-02-27 NOTE — PHYSICAL EXAM
[No Acute Distress] : no acute distress [Well Nourished] : well nourished [No Deformities] : no deformities [Well Developed] : well developed [Normal Appearance] : normal appearance [No Resp Distress] : no resp distress [Normal Color/ Pigmentation] : normal color/ pigmentation [No Focal Deficits] : no focal deficits [Oriented x3] : oriented x3 [Normal Mood] : normal mood [Normal Affect] : normal affect [Remote Memory Normal] : remote memory normal

## 2023-02-27 NOTE — HISTORY OF PRESENT ILLNESS
[Home] : at home, [unfilled] , at the time of the visit. [Medical Office: ([facility])___] : at the medical office located in  [Verbal consent obtained from patient] : the patient, [unfilled] [TextBox_4] : VISIT 10/10/22:\par Ms. HORTON is a 37 year female with a history of dextrocardia, pneumothorax on the left lung (2015)), VATS pleurodesis (2015), reactive airways growing up (?asthma), receiving IVF for infertility, who now comes in for a follow up pulmonary evaluation. 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 HORTON is a 36 year female with a history of dextrocardia, pneumothorax on the left lung (2015)), VATS pleurodesis (2015), reactive airways growing up (?asthma), s/p IVF for infertility, who now comes in via video call for a follow up  pulmonary evaluation for infertility therapy, SOB, RADS/asthma, prior left sided spontaneous PTX who is here for routine follow up and med renewals. \par \par Pt reports she is in her normal state of health overall,\par No changes to her medications or medical conditions.\par She is compliant with her inhaler regimen and doing well. She feels her asthma is stable. She has not needed to use her rescue inhaler whatsoever except when had an URI earlier in the winter. \par \par Denies sinus issues, GERD symptoms, SOB, CORREIA, chest pain or pressure, recent illness. \par No new complaints. \par \par \par

## 2023-03-06 LAB
ALBUMIN SERPL ELPH-MCNC: 4.6 G/DL
ALP BLD-CCNC: 79 U/L
ALT SERPL-CCNC: 11 U/L
ANION GAP SERPL CALC-SCNC: 13 MMOL/L
AST SERPL-CCNC: 13 U/L
BASOPHILS # BLD AUTO: 0.04 K/UL
BASOPHILS NFR BLD AUTO: 0.6 %
BILIRUB SERPL-MCNC: 0.3 MG/DL
BUN SERPL-MCNC: 14 MG/DL
CALCIUM SERPL-MCNC: 9.5 MG/DL
CHLORIDE SERPL-SCNC: 107 MMOL/L
CHOLEST SERPL-MCNC: 158 MG/DL
CO2 SERPL-SCNC: 22 MMOL/L
CREAT SERPL-MCNC: 0.71 MG/DL
EGFR: 114 ML/MIN/1.73M2
EOSINOPHIL # BLD AUTO: 0.04 K/UL
EOSINOPHIL NFR BLD AUTO: 0.6 %
ESTIMATED AVERAGE GLUCOSE: 108 MG/DL
FOLATE SERPL-MCNC: >20 NG/ML
GLUCOSE SERPL-MCNC: 114 MG/DL
HBA1C MFR BLD HPLC: 5.4 %
HCT VFR BLD CALC: 36.5 %
HDLC SERPL-MCNC: 43 MG/DL
HGB BLD-MCNC: 12.3 G/DL
IMM GRANULOCYTES NFR BLD AUTO: 0.3 %
LDLC SERPL CALC-MCNC: 89 MG/DL
LYMPHOCYTES # BLD AUTO: 1.42 K/UL
LYMPHOCYTES NFR BLD AUTO: 21.7 %
MAN DIFF?: NORMAL
MCHC RBC-ENTMCNC: 30.4 PG
MCHC RBC-ENTMCNC: 33.7 GM/DL
MCV RBC AUTO: 90.3 FL
MONOCYTES # BLD AUTO: 0.3 K/UL
MONOCYTES NFR BLD AUTO: 4.6 %
NEUTROPHILS # BLD AUTO: 4.72 K/UL
NEUTROPHILS NFR BLD AUTO: 72.2 %
NONHDLC SERPL-MCNC: 115 MG/DL
PLATELET # BLD AUTO: 268 K/UL
POTASSIUM SERPL-SCNC: 4.4 MMOL/L
PROT SERPL-MCNC: 7 G/DL
RBC # BLD: 4.04 M/UL
RBC # FLD: 12.3 %
SODIUM SERPL-SCNC: 142 MMOL/L
T3 SERPL-MCNC: 142 NG/DL
T4 SERPL-MCNC: 8.1 UG/DL
TRIGL SERPL-MCNC: 131 MG/DL
TSH SERPL-ACNC: 1.77 UIU/ML
VIT B12 SERPL-MCNC: 358 PG/ML
WBC # FLD AUTO: 6.54 K/UL

## 2023-03-12 LAB
CORTICOSTEROID BIND GLOBULIN: 3.3 MG/DL
CORTIS SERPL-MCNC: 6.3 UG/DL
CORTISOL, FREE: 0.13 UG/DL
PFCX: 2.1 %

## 2023-04-06 ENCOUNTER — APPOINTMENT (OUTPATIENT)
Dept: PULMONOLOGY | Facility: CLINIC | Age: 38
End: 2023-04-06
Payer: COMMERCIAL

## 2023-04-06 PROCEDURE — 99214 OFFICE O/P EST MOD 30 MIN: CPT | Mod: 95

## 2023-04-06 RX ORDER — ALBUTEROL SULFATE 90 UG/1
108 (90 BASE) INHALANT RESPIRATORY (INHALATION)
Qty: 3 | Refills: 1 | Status: ACTIVE | COMMUNITY

## 2023-04-06 RX ORDER — BECLOMETHASONE DIPROPIONATE HFA 80 UG/1
80 AEROSOL, METERED RESPIRATORY (INHALATION)
Qty: 31.8 | Refills: 1 | Status: ACTIVE | COMMUNITY
Start: 2021-05-10 | End: 1900-01-01

## 2023-04-06 NOTE — ASSESSMENT
[FreeTextEntry1] : Ms. HORTON is a 37 year female with a history of dextrocardia, pneumothorax on the left lung (2015)), VATS pleurodesis (2015), reactive airways growing up (?asthma), s/p IVF for infertility, who now comes in via video call for a follow up  pulmonary evaluation for infertility therapy, SOB, RADS/asthma, prior left sided spontaneous PTX - s/p is COVID-19 7/2022; s/p delivery -stable over all \par \par The patient's SOB is felt to be multifactorial:\par -poor mechanics of breathing\par -out of shape\par -Pulmonary\par  -Hx of spontaneous PTX\par  -volume loss (c/w restrictive lung disease)\par  -RADS/asthma\par  -anemia\par -Cardiac\par  -dextrocardia (Danielmurali MilnerGoldberg)\par \par Problem 1: RADS/asthma\par -continue Bevespi 2 puffs BID followed by Anoro at 1 inhalation QD followed by Qvar Redihaler 80 2 puffs BID \par -continue ProAir 2 puffs Q6H, pre-exercise\par - Inhaler technique reviewed as well as oral hygiene technique reviewed with patient. Avoidance of cold air, extremes of temperature, rescue inhaler should be used before exercise. Order of medication reviewed with patient. Recommended use of a cool mist humidifier in the bedroom. \par - Asthma is believed to be caused by inherited (genetic) and environmental factor, but its exact cause is unknown. asthma may be triggered by allergens, lung infections, or irritants in the air. Asthma triggers are different for each person \par \par Problem 1A: COVID-19 7/2022 (resolved) \par -Recommend "Sanotize" nasal spray \par - MAB \par - [place]ali[place] Cold and Flu \par - s/p COVID-19 vax x3 \par - no Paxlovid \par \par Problem 2: Allergies (NC)- quiet \par -get Blood work to include: asthma panel, food IgE panel, IgE level, eosinophil level, vitamin D level (NC)\par -Environmental measures for allergies were encouraged including mattress and pillow cover, air purifier, and environmental controls. \par \par Problem 3: Anemia\par -Complete iron studies (NC) -s/p supplements \par \par Problem 4: Hx of pneumothorax s/p pleurodesis- Agenesis left lung \par -appears to be quiescent\par The patient has a pneumothorax from trauma, iatrogenic, post surgical. You are at risk for one in the future and may need hospitalization. Thoracic surgical evaluation is needed for chest tube and other intervention. \par \par \par Problem 5:Cardiac\par -Recommend cardiac follow up evaluation with cardiologist Dr. Joel Goldberg if needed \par \par Problem 6:overweight/out of shape\par -Recommend "Muniq" OTC for weight loss, energy, and blood sugar \par -Recommended Fernando Dari book on 10 day Detox \par -Recommended to limit salt / calories \par - Weight loss, exercise and diet control were discussed and are highly encouraged. Treatment options were given such as aqua therapy, and contacting a nutritionist. Recommended to use the elliptical, stationary bike, less use of treadmill. Mindful eating was explained to the patient. Obesity is associated with worsening asthma, SOB, and potential for cardiac disease, diabetes, and other underlying medical conditions.\par \par Problem 7: Poor mechanics of breathing\par - Recommended "The Gift of Maybe" by Susie Ba for anxiety\par -Recommend Wim Hof and Buteyko breathing techniques \par - Proper breathing techniques were reviewed with an emphasis on exhalation. Patient instructed to breath in for 1 second and out for four seconds. Patient was encouraged not to talk while walking.\par \par Problem 8: Health Maintenance\par -Epifanio [employer] \par -s/p COVID-19 vaccine x4\par -s/p flu shot 2022 \par -recommended strep pneumonia vaccines: Prevnar-13 vaccine, follow by Pneumo vaccine 23 one year following\par -recommended early intervention for URIs\par -recommended regular osteoporosis evaluations\par -recommended early dermatological evaluations\par -recommended after the age of 50 to the age of 70, colonoscopy every 5 years\par \par f/u in 6-8 weeks\par pt is encouraged to call or fax the office with any questions or concerns.\par

## 2023-04-06 NOTE — HISTORY OF PRESENT ILLNESS
[Home] : at home, [unfilled] , at the time of the visit. [Medical Office: ([facility])___] : at the medical office located in  [Verbal consent obtained from patient] : the patient, [unfilled] [TextBox_4] : Ms. HORTON is a 37 year female with a history of dextrocardia, pneumothorax on the left lung (2015)), VATS pleurodesis (2015), reactive airways growing up (?asthma), receiving IVF for infertility, who now comes in for a follow up pulmonary evaluation. Her chief complaint is\par - she notes she has been feeling good, her daughter has just turned 1. \par - she had just went to the doctor and had a whole blood panel done and notes that everything is good \par - .bow \par - vision has been good\par - no ankle / leg swelling\par - she notes her weight has been good, though she feels like she could do more physical activity \par - she notes she had COVID back in July but nothing since then \par - she notes she just went back on BC pill 2 months ago; menstrual cycle has not been back to super regular yet \par - she notes sometimes randomly she feels "hung over" or overly fatigued. \par - she notes her breathing has been controlled \par - she notes she takes both of her inhalers \par - she is now on a multi-vitamin \par -She denies any visual issues, headaches, nausea, vomiting, fever, chills, sweats, chest pains, chest pressure, diarrhea, constipation, dysphagia, myalgia, dizziness, leg swelling, leg pain, itchy eyes, itchy ears, heartburn, reflux, or sour taste in the mouth.\par

## 2023-04-06 NOTE — ADDENDUM
[FreeTextEntry1] : Documented by Earnestine Gudino acting as a scribe for Dr. Vladimir Bennett on 04/06/2023 \par \par All medical record entries made by the Scribe were at my, Dr. Vladimir Bennett's, direction and personally dictated by me on 04/06/2023 . I have reviewed the chart and agree that the record accurately reflects my personal performance of the history, physical exam, assessment and plan. I have also personally directed, reviewed, and agree with the discharge instructions.

## 2023-05-16 ENCOUNTER — APPOINTMENT (OUTPATIENT)
Dept: COLORECTAL SURGERY | Facility: CLINIC | Age: 38
End: 2023-05-16
Payer: COMMERCIAL

## 2023-05-16 DIAGNOSIS — K64.8 OTHER HEMORRHOIDS: ICD-10-CM

## 2023-05-16 PROCEDURE — 46600 DIAGNOSTIC ANOSCOPY SPX: CPT

## 2023-05-16 PROCEDURE — 99204 OFFICE O/P NEW MOD 45 MIN: CPT | Mod: 25

## 2023-05-18 ENCOUNTER — NON-APPOINTMENT (OUTPATIENT)
Age: 38
End: 2023-05-18

## 2023-05-19 PROBLEM — K64.8 INTERNAL AND EXTERNAL PROLAPSED HEMORRHOIDS: Status: ACTIVE | Noted: 2023-05-19

## 2023-05-19 NOTE — PROCEDURE
[FreeTextEntry1] : after consent, anoscopy was performed showing multiple grade 3 internal and external hemorrhoids.

## 2023-05-19 NOTE — REVIEW OF SYSTEMS
[As Noted in HPI] : as noted in HPI [Constipation] : constipation [Negative] : Heme/Lymph [FreeTextEntry7] : hemorrhoids

## 2023-05-19 NOTE — PHYSICAL EXAM
[Abdomen Masses] : No abdominal masses [Abdomen Tenderness] : ~T No ~M abdominal tenderness [Tender] : nontender [Tender, Swollen] : tender, swollen [Normal] : was normal [None] : there was no rectal mass  [JVD] : no jugular venous distention  [No Rash or Lesion] : No rash or lesion [Alert] : alert [Oriented to Person] : oriented to person [Oriented to Place] : oriented to place [Oriented to Time] : oriented to time [Calm] : calm [de-identified] : looks well nad

## 2023-05-19 NOTE — HISTORY OF PRESENT ILLNESS
[FreeTextEntry1] : initial office visit for this 37 year old female with complaints of prolapsing hemorrhoids with pain since she gave birth last year. symptoms have worsened.

## 2023-05-19 NOTE — ASSESSMENT
[FreeTextEntry1] : 37 year old female with extensive internal and external hemorrhoids. recommend surgery with hemorrhoidectomy. risk and benefits explained including bleeding, infections, recurrence, pain, fissures, fistulas and incontinence.

## 2023-06-27 ENCOUNTER — OUTPATIENT (OUTPATIENT)
Dept: OUTPATIENT SERVICES | Facility: HOSPITAL | Age: 38
LOS: 1 days | End: 2023-06-27
Payer: COMMERCIAL

## 2023-06-27 ENCOUNTER — RESULT REVIEW (OUTPATIENT)
Age: 38
End: 2023-06-27

## 2023-06-27 VITALS
SYSTOLIC BLOOD PRESSURE: 113 MMHG | OXYGEN SATURATION: 99 % | RESPIRATION RATE: 16 BRPM | HEART RATE: 79 BPM | TEMPERATURE: 98 F | DIASTOLIC BLOOD PRESSURE: 62 MMHG | WEIGHT: 157.63 LBS | HEIGHT: 67 IN

## 2023-06-27 DIAGNOSIS — K64.8 OTHER HEMORRHOIDS: ICD-10-CM

## 2023-06-27 DIAGNOSIS — Z87.09 PERSONAL HISTORY OF OTHER DISEASES OF THE RESPIRATORY SYSTEM: Chronic | ICD-10-CM

## 2023-06-27 DIAGNOSIS — Z98.890 OTHER SPECIFIED POSTPROCEDURAL STATES: Chronic | ICD-10-CM

## 2023-06-27 DIAGNOSIS — Z98.891 HISTORY OF UTERINE SCAR FROM PREVIOUS SURGERY: Chronic | ICD-10-CM

## 2023-06-27 DIAGNOSIS — Z01.818 ENCOUNTER FOR OTHER PREPROCEDURAL EXAMINATION: ICD-10-CM

## 2023-06-27 LAB
ANION GAP SERPL CALC-SCNC: 2 MMOL/L — LOW (ref 5–17)
APTT BLD: 28 SEC — SIGNIFICANT CHANGE UP (ref 27.5–35.5)
BASOPHILS # BLD AUTO: 0.03 K/UL — SIGNIFICANT CHANGE UP (ref 0–0.2)
BASOPHILS NFR BLD AUTO: 0.4 % — SIGNIFICANT CHANGE UP (ref 0–2)
BUN SERPL-MCNC: 16 MG/DL — SIGNIFICANT CHANGE UP (ref 7–23)
CALCIUM SERPL-MCNC: 9.3 MG/DL — SIGNIFICANT CHANGE UP (ref 8.5–10.1)
CHLORIDE SERPL-SCNC: 110 MMOL/L — HIGH (ref 96–108)
CO2 SERPL-SCNC: 27 MMOL/L — SIGNIFICANT CHANGE UP (ref 22–31)
CREAT SERPL-MCNC: 0.84 MG/DL — SIGNIFICANT CHANGE UP (ref 0.5–1.3)
EGFR: 93 ML/MIN/1.73M2 — SIGNIFICANT CHANGE UP
EOSINOPHIL # BLD AUTO: 0.03 K/UL — SIGNIFICANT CHANGE UP (ref 0–0.5)
EOSINOPHIL NFR BLD AUTO: 0.4 % — SIGNIFICANT CHANGE UP (ref 0–6)
GLUCOSE SERPL-MCNC: 103 MG/DL — HIGH (ref 70–99)
HCT VFR BLD CALC: 38.3 % — SIGNIFICANT CHANGE UP (ref 34.5–45)
HGB BLD-MCNC: 13.1 G/DL — SIGNIFICANT CHANGE UP (ref 11.5–15.5)
IMM GRANULOCYTES NFR BLD AUTO: 0.4 % — SIGNIFICANT CHANGE UP (ref 0–0.9)
INR BLD: 1.02 RATIO — SIGNIFICANT CHANGE UP (ref 0.88–1.16)
LYMPHOCYTES # BLD AUTO: 1.77 K/UL — SIGNIFICANT CHANGE UP (ref 1–3.3)
LYMPHOCYTES # BLD AUTO: 24.2 % — SIGNIFICANT CHANGE UP (ref 13–44)
MCHC RBC-ENTMCNC: 31.2 PG — SIGNIFICANT CHANGE UP (ref 27–34)
MCHC RBC-ENTMCNC: 34.2 GM/DL — SIGNIFICANT CHANGE UP (ref 32–36)
MCV RBC AUTO: 91.2 FL — SIGNIFICANT CHANGE UP (ref 80–100)
MONOCYTES # BLD AUTO: 0.46 K/UL — SIGNIFICANT CHANGE UP (ref 0–0.9)
MONOCYTES NFR BLD AUTO: 6.3 % — SIGNIFICANT CHANGE UP (ref 2–14)
NEUTROPHILS # BLD AUTO: 5 K/UL — SIGNIFICANT CHANGE UP (ref 1.8–7.4)
NEUTROPHILS NFR BLD AUTO: 68.3 % — SIGNIFICANT CHANGE UP (ref 43–77)
PLATELET # BLD AUTO: 267 K/UL — SIGNIFICANT CHANGE UP (ref 150–400)
POTASSIUM SERPL-MCNC: 4.6 MMOL/L — SIGNIFICANT CHANGE UP (ref 3.5–5.3)
POTASSIUM SERPL-SCNC: 4.6 MMOL/L — SIGNIFICANT CHANGE UP (ref 3.5–5.3)
PROTHROM AB SERPL-ACNC: 11.8 SEC — SIGNIFICANT CHANGE UP (ref 10.5–13.4)
RBC # BLD: 4.2 M/UL — SIGNIFICANT CHANGE UP (ref 3.8–5.2)
RBC # FLD: 11.9 % — SIGNIFICANT CHANGE UP (ref 10.3–14.5)
SODIUM SERPL-SCNC: 139 MMOL/L — SIGNIFICANT CHANGE UP (ref 135–145)
WBC # BLD: 7.32 K/UL — SIGNIFICANT CHANGE UP (ref 3.8–10.5)
WBC # FLD AUTO: 7.32 K/UL — SIGNIFICANT CHANGE UP (ref 3.8–10.5)

## 2023-06-27 PROCEDURE — 80048 BASIC METABOLIC PNL TOTAL CA: CPT

## 2023-06-27 PROCEDURE — 71046 X-RAY EXAM CHEST 2 VIEWS: CPT | Mod: 26

## 2023-06-27 PROCEDURE — 71046 X-RAY EXAM CHEST 2 VIEWS: CPT

## 2023-06-27 PROCEDURE — 36415 COLL VENOUS BLD VENIPUNCTURE: CPT

## 2023-06-27 PROCEDURE — 85730 THROMBOPLASTIN TIME PARTIAL: CPT

## 2023-06-27 PROCEDURE — 85025 COMPLETE CBC W/AUTO DIFF WBC: CPT

## 2023-06-27 PROCEDURE — 93005 ELECTROCARDIOGRAM TRACING: CPT

## 2023-06-27 PROCEDURE — 99214 OFFICE O/P EST MOD 30 MIN: CPT | Mod: 25

## 2023-06-27 PROCEDURE — 85610 PROTHROMBIN TIME: CPT

## 2023-06-27 PROCEDURE — 93010 ELECTROCARDIOGRAM REPORT: CPT

## 2023-06-27 NOTE — H&P PST ADULT - HISTORY OF PRESENT ILLNESS
37 year old female pmh of right lung agenesis and dextrocardia; Pt. diagnosed  with hemorrhoids; Pt said hemorrhoids was due to pregnancy and has increased in size; denies bleeding and pain; she presents to PST for planned hemorrhoidectomy partial proctectomy and proctoplasty

## 2023-06-27 NOTE — H&P PST ADULT - NSICDXPASTSURGICALHX_GEN_ALL_CORE_FT
PAST SURGICAL HISTORY:  H/O pneumothorax      product of IVF pregnancy      PAST SURGICAL HISTORY:  H/O:      History of lung surgery      product of IVF pregnancy

## 2023-06-27 NOTE — H&P PST ADULT - ASSESSMENT
Plan:  1. PST instructions given ; NPO status/  instructions to be given by ASU   2. Pt instructed to take following meds on day of surgery:   3. Pt instructed to take routine evening medications unless indicated   4. Stop NSAIDS ( Aspirin Alev Motrin Mobic Diclofenac), herbal supplements , MVI , Vitamin fish oil 7 days prior to surgery  unless   directed by surgeon or cardiologist;   5. Medical Optimization  with    6. EZ wash instructions given & mupirocin instructions given  7. Labs EKG CXR as per surgeon request   8. Pt denies covid symptoms shortness of breath fever cough      37 year old female pmh of right lung agenesis and dextrocardia; Pt. diagnosed  with hemorrhoids; Pt said hemorrhoids was due to pregnancy and has increased in size; denies bleeding and pain; she presents to PST for planned hemorrhoidectomy partial proctectomy and proctoplasty       Plan:  1. PST instructions given ; NPO status/  instructions to be given by ASU   2. Pt instructed to take following meds on day of surgery: Qvar Anoro ,albuterol prn and Lo Loestrin   3. Pt instructed to take routine evening medications unless indicated   4. Stop NSAIDS ( Aspirin Alev Motrin Mobic Diclofenac), herbal supplements , MVI , Vitamin fish oil 7 days prior to surgery  unless   directed by surgeon or cardiologist;   5. Medical Optimization  not indicated   6. EZ wash instructions given   7. Labs EKG CXR as per surgeon request   8. Pt denies covid symptoms shortness of breath fever cough   9. Urine for pregnancy on day of surgery

## 2023-06-27 NOTE — H&P PST ADULT - NSICDXPASTMEDICALHX_GEN_ALL_CORE_FT
PAST MEDICAL HISTORY:  Agenesis of right lung     Asthma     Dextrocardia      PAST MEDICAL HISTORY:  Agenesis of right lung     Asthma     Dextrocardia     H/O pneumothorax     Hemorrhoids     Migraine     RAD (reactive airway disease)

## 2023-06-27 NOTE — H&P PST ADULT - NSICDXFAMILYHX_GEN_ALL_CORE_FT
FAMILY HISTORY:  Mother  Still living? Unknown  FH: breast cancer, Age at diagnosis: Age Unknown    Grandparent  Still living? Unknown  FH: ovarian cancer, Age at diagnosis: Age Unknown

## 2023-06-28 DIAGNOSIS — K64.8 OTHER HEMORRHOIDS: ICD-10-CM

## 2023-06-28 DIAGNOSIS — Z01.818 ENCOUNTER FOR OTHER PREPROCEDURAL EXAMINATION: ICD-10-CM

## 2023-07-05 RX ORDER — ONDANSETRON 8 MG/1
4 TABLET, FILM COATED ORAL ONCE
Refills: 0 | Status: DISCONTINUED | OUTPATIENT
Start: 2023-07-06 | End: 2023-07-06

## 2023-07-05 RX ORDER — FENTANYL CITRATE 50 UG/ML
50 INJECTION INTRAVENOUS
Refills: 0 | Status: DISCONTINUED | OUTPATIENT
Start: 2023-07-06 | End: 2023-07-06

## 2023-07-05 RX ORDER — SODIUM CHLORIDE 9 MG/ML
1000 INJECTION, SOLUTION INTRAVENOUS
Refills: 0 | Status: DISCONTINUED | OUTPATIENT
Start: 2023-07-06 | End: 2023-07-06

## 2023-07-05 RX ORDER — OXYCODONE HYDROCHLORIDE 5 MG/1
5 TABLET ORAL ONCE
Refills: 0 | Status: DISCONTINUED | OUTPATIENT
Start: 2023-07-06 | End: 2023-07-06

## 2023-07-06 ENCOUNTER — TRANSCRIPTION ENCOUNTER (OUTPATIENT)
Age: 38
End: 2023-07-06

## 2023-07-06 ENCOUNTER — RESULT REVIEW (OUTPATIENT)
Age: 38
End: 2023-07-06

## 2023-07-06 ENCOUNTER — APPOINTMENT (OUTPATIENT)
Dept: COLORECTAL SURGERY | Facility: HOSPITAL | Age: 38
End: 2023-07-06

## 2023-07-06 ENCOUNTER — OUTPATIENT (OUTPATIENT)
Dept: INPATIENT UNIT | Facility: HOSPITAL | Age: 38
LOS: 1 days | Discharge: HOME | End: 2023-07-06
Payer: COMMERCIAL

## 2023-07-06 VITALS
HEIGHT: 67 IN | RESPIRATION RATE: 14 BRPM | DIASTOLIC BLOOD PRESSURE: 58 MMHG | WEIGHT: 160.06 LBS | TEMPERATURE: 98 F | SYSTOLIC BLOOD PRESSURE: 102 MMHG | HEART RATE: 70 BPM | OXYGEN SATURATION: 98 %

## 2023-07-06 VITALS
SYSTOLIC BLOOD PRESSURE: 109 MMHG | HEART RATE: 72 BPM | OXYGEN SATURATION: 100 % | TEMPERATURE: 98 F | DIASTOLIC BLOOD PRESSURE: 63 MMHG | RESPIRATION RATE: 15 BRPM

## 2023-07-06 DIAGNOSIS — K64.4 RESIDUAL HEMORRHOIDAL SKIN TAGS: ICD-10-CM

## 2023-07-06 DIAGNOSIS — Z98.891 HISTORY OF UTERINE SCAR FROM PREVIOUS SURGERY: Chronic | ICD-10-CM

## 2023-07-06 DIAGNOSIS — Z98.890 OTHER SPECIFIED POSTPROCEDURAL STATES: Chronic | ICD-10-CM

## 2023-07-06 DIAGNOSIS — K64.8 OTHER HEMORRHOIDS: ICD-10-CM

## 2023-07-06 LAB — HCG UR QL: NEGATIVE — SIGNIFICANT CHANGE UP

## 2023-07-06 PROCEDURE — 81025 URINE PREGNANCY TEST: CPT

## 2023-07-06 PROCEDURE — 88304 TISSUE EXAM BY PATHOLOGIST: CPT

## 2023-07-06 PROCEDURE — 88304 TISSUE EXAM BY PATHOLOGIST: CPT | Mod: 26

## 2023-07-06 PROCEDURE — 46260 REMOVE IN/EX HEM GROUPS 2+: CPT

## 2023-07-06 PROCEDURE — C9290: CPT

## 2023-07-06 RX ORDER — UMECLIDINIUM BROMIDE AND VILANTEROL TRIFENATATE 62.5; 25 UG/1; UG/1
1 POWDER RESPIRATORY (INHALATION)
Refills: 0 | DISCHARGE

## 2023-07-06 RX ORDER — OXYCODONE AND ACETAMINOPHEN 5; 325 MG/1; MG/1
1 TABLET ORAL EVERY 4 HOURS
Refills: 0 | Status: DISCONTINUED | OUTPATIENT
Start: 2023-07-06 | End: 2023-07-06

## 2023-07-06 RX ORDER — NORETHINDRONE AND ETHINYL ESTRADIOL 0.4-0.035
1 KIT ORAL
Refills: 0 | DISCHARGE

## 2023-07-06 RX ORDER — ALBUTEROL 90 UG/1
2 AEROSOL, METERED ORAL
Refills: 0 | DISCHARGE

## 2023-07-06 RX ORDER — ONDANSETRON 8 MG/1
4 TABLET, FILM COATED ORAL EVERY 6 HOURS
Refills: 0 | Status: DISCONTINUED | OUTPATIENT
Start: 2023-07-06 | End: 2023-07-06

## 2023-07-06 RX ORDER — BECLOMETHASONE DIPROPIONATE 40 UG/1
1 AEROSOL, METERED RESPIRATORY (INHALATION)
Refills: 0 | DISCHARGE

## 2023-07-06 RX ORDER — OXYCODONE HYDROCHLORIDE 5 MG/1
1 TABLET ORAL
Qty: 20 | Refills: 0
Start: 2023-07-06

## 2023-07-06 NOTE — ASU DISCHARGE PLAN (ADULT/PEDIATRIC) - NS MD DC FALL RISK RISK
For information on Fall & Injury Prevention, visit: [email or web address] OR  [email or web address] OR  https://www.cdc.gov/steadi/patient.html

## 2023-07-06 NOTE — BRIEF OPERATIVE NOTE - SPECIMENS
Posterior column internal and external hemorrhoids, Right column internal and external hemorrhoids, Left colum internal hemorrhoids

## 2023-07-06 NOTE — ASU PATIENT PROFILE, ADULT - NSICDXPASTMEDICALHX_GEN_ALL_CORE_FT
PAST MEDICAL HISTORY:  Agenesis of right lung     Asthma Sx's due to 1 Lung    Dextrocardia     H/O pneumothorax     Hemorrhoids     Migraine     RAD (reactive airway disease)

## 2023-07-06 NOTE — ASU DISCHARGE PLAN (ADULT/PEDIATRIC) - ASU DC SPECIAL INSTRUCTIONSFT
remove all gauze and shower with soap and water. apply new gauze daily and as needed. May use warm water to soak two-three times per day. may take colace and/or milk of magnesia as needed for constipation. ice packs to area as needed. May take Tylenol and/or motrin for mild pain. Take metamucil powder, fiber  daily with plenty of water. may use bacitracin ointment or Vitamin A&D ointment as needed to area.Expect rectal bleeding on/off for the next 6-8 weeks. Expect  intermittent rectal pain and soreness for the next 3-4 weeks.   FOLLOW UP IN 4 WEEKS.

## 2023-07-06 NOTE — ASU DISCHARGE PLAN (ADULT/PEDIATRIC) - CARE PROVIDER_API CALL
Darius Gee  Colon/Rectal Surgery  [address], [place] [address]  Phone: [phone]  Fax: [phone]  Follow Up Time: 1 month

## 2023-07-06 NOTE — BRIEF OPERATIVE NOTE - NSICDXBRIEFPROCEDURE_GEN_ALL_CORE_FT
PROCEDURES:  Hemorrhoidectomy, internal and external, with fissurectomy involving 2 or more anal columns 06-Jul-2023 08:08:39  Lisa Bolanos

## 2023-07-10 DIAGNOSIS — K64.8 OTHER HEMORRHOIDS: ICD-10-CM

## 2023-07-10 DIAGNOSIS — J45.909 UNSPECIFIED ASTHMA, UNCOMPLICATED: ICD-10-CM

## 2023-07-10 DIAGNOSIS — Z79.51 LONG TERM (CURRENT) USE OF INHALED STEROIDS: ICD-10-CM

## 2023-07-10 DIAGNOSIS — K64.4 RESIDUAL HEMORRHOIDAL SKIN TAGS: ICD-10-CM

## 2023-07-10 DIAGNOSIS — Q33.3 AGENESIS OF LUNG: ICD-10-CM

## 2023-07-10 LAB — SURGICAL PATHOLOGY STUDY: SIGNIFICANT CHANGE UP

## 2023-07-12 PROBLEM — J45.909 UNSPECIFIED ASTHMA, UNCOMPLICATED: Chronic | Status: ACTIVE | Noted: 2022-03-17

## 2023-07-12 PROBLEM — K64.9 UNSPECIFIED HEMORRHOIDS: Chronic | Status: ACTIVE | Noted: 2023-06-27

## 2023-07-12 PROBLEM — Z87.09 PERSONAL HISTORY OF OTHER DISEASES OF THE RESPIRATORY SYSTEM: Chronic | Status: ACTIVE | Noted: 2023-06-27

## 2023-07-12 PROBLEM — G43.909 MIGRAINE, UNSPECIFIED, NOT INTRACTABLE, WITHOUT STATUS MIGRAINOSUS: Chronic | Status: ACTIVE | Noted: 2023-06-27

## 2023-07-12 PROBLEM — J45.909 UNSPECIFIED ASTHMA, UNCOMPLICATED: Chronic | Status: ACTIVE | Noted: 2023-06-27

## 2023-08-09 ENCOUNTER — APPOINTMENT (OUTPATIENT)
Dept: COLORECTAL SURGERY | Facility: CLINIC | Age: 38
End: 2023-08-09

## 2023-08-11 ENCOUNTER — APPOINTMENT (OUTPATIENT)
Dept: COLORECTAL SURGERY | Facility: CLINIC | Age: 38
End: 2023-08-11
Payer: COMMERCIAL

## 2023-08-11 VITALS
WEIGHT: 155 LBS | DIASTOLIC BLOOD PRESSURE: 83 MMHG | BODY MASS INDEX: 24.33 KG/M2 | SYSTOLIC BLOOD PRESSURE: 124 MMHG | HEART RATE: 108 BPM | RESPIRATION RATE: 14 BRPM | HEIGHT: 67 IN

## 2023-08-11 DIAGNOSIS — Z09 ENCOUNTER FOR FOLLOW-UP EXAMINATION AFTER COMPLETED TREATMENT FOR CONDITIONS OTHER THAN MALIGNANT NEOPLASM: ICD-10-CM

## 2023-08-11 PROCEDURE — 99024 POSTOP FOLLOW-UP VISIT: CPT

## 2023-08-11 NOTE — PHYSICAL EXAM
[Skin Tags] : residual hemorrhoidal skin tags were noted [No Rash or Lesion] : No rash or lesion [Alert] : alert [Oriented to Person] : oriented to person [Oriented to Place] : oriented to place [Oriented to Time] : oriented to time [Calm] : calm [de-identified] : areas of healing as well as left sided pedunculated skin tags [de-identified] : No apparent distress [de-identified] : Normocephalic atraumatic [de-identified] : Moving all extremities x4

## 2023-08-11 NOTE — HISTORY OF PRESENT ILLNESS
[FreeTextEntry1] : initial office visit for this 37 year old female with complaints of prolapsing hemorrhoids with pain since she gave birth last year. symptoms have worsened.   8/11/23 here for postoperative visit after undergoing hemorrhoidectomy on 7/6/2023. Feels much improved. Has residual tags and swelling.

## 2023-08-11 NOTE — ASSESSMENT
[FreeTextEntry1] : 8/11/23 here for postop visit after undergoing hemorrhoidectomy on 7/6/2023.  She continues to have perianal swelling so postoperative pain has resolved.  She is passing stools without issues and without bleeding.  Advised her to allow for at least 2 more months for postop edema to resolve and improved.  If skin tags continue to be an issue despite tincture of time, recommended return to office for further evaluation and possible bedside excision of these residual skin tags.  She did voice frustration over recurrence, but understood that this is a potential consequence of surgery.  Follow-up as needed.

## 2023-09-11 NOTE — OB PST NOTE - NS_PRENATALMEDS_OBGYN_A_OB
----- Message from Raj Seay MD sent at 9/11/2023  9:51 AM EDT -----  Please schedule patient for office follow-up with me for next available.
Patient agreeable to 12/6/23 at [facility] for [facility].
Prenatal Vitamins/Steroids (other)

## 2023-09-18 ENCOUNTER — APPOINTMENT (OUTPATIENT)
Dept: PULMONOLOGY | Facility: CLINIC | Age: 38
End: 2023-09-18
Payer: COMMERCIAL

## 2023-09-18 PROCEDURE — 99214 OFFICE O/P EST MOD 30 MIN: CPT | Mod: 95

## 2023-10-09 ENCOUNTER — APPOINTMENT (OUTPATIENT)
Dept: PULMONOLOGY | Facility: CLINIC | Age: 38
End: 2023-10-09
Payer: COMMERCIAL

## 2023-10-09 VITALS
SYSTOLIC BLOOD PRESSURE: 118 MMHG | BODY MASS INDEX: 24.33 KG/M2 | DIASTOLIC BLOOD PRESSURE: 78 MMHG | WEIGHT: 155 LBS | RESPIRATION RATE: 16 BRPM | OXYGEN SATURATION: 98 % | TEMPERATURE: 97.4 F | HEIGHT: 67 IN | HEART RATE: 88 BPM

## 2023-10-09 DIAGNOSIS — Q24.0 DEXTROCARDIA: ICD-10-CM

## 2023-10-09 DIAGNOSIS — U07.1 COVID-19: ICD-10-CM

## 2023-10-09 DIAGNOSIS — Q33.3 AGENESIS OF LUNG: ICD-10-CM

## 2023-10-09 PROCEDURE — 94727 GAS DIL/WSHOT DETER LNG VOL: CPT

## 2023-10-09 PROCEDURE — 94010 BREATHING CAPACITY TEST: CPT

## 2023-10-09 PROCEDURE — 95012 NITRIC OXIDE EXP GAS DETER: CPT

## 2023-10-09 PROCEDURE — 99214 OFFICE O/P EST MOD 30 MIN: CPT | Mod: 25

## 2023-10-09 PROCEDURE — 94729 DIFFUSING CAPACITY: CPT

## 2023-10-13 ENCOUNTER — APPOINTMENT (OUTPATIENT)
Dept: OBGYN | Facility: CLINIC | Age: 38
End: 2023-10-13
Payer: COMMERCIAL

## 2023-10-13 VITALS
BODY MASS INDEX: 24.33 KG/M2 | DIASTOLIC BLOOD PRESSURE: 78 MMHG | WEIGHT: 155 LBS | SYSTOLIC BLOOD PRESSURE: 116 MMHG | HEIGHT: 67 IN

## 2023-10-13 LAB
HCG UR QL: NEGATIVE
QUALITY CONTROL: YES

## 2023-10-13 PROCEDURE — 99212 OFFICE O/P EST SF 10 MIN: CPT

## 2023-12-08 ENCOUNTER — RX RENEWAL (OUTPATIENT)
Age: 38
End: 2023-12-08

## 2023-12-08 RX ORDER — UMECLIDINIUM BROMIDE AND VILANTEROL TRIFENATATE 62.5; 25 UG/1; UG/1
62.5-25 POWDER RESPIRATORY (INHALATION)
Qty: 3 | Refills: 1 | Status: ACTIVE | COMMUNITY
Start: 2022-11-25 | End: 1900-01-01

## 2024-02-02 ENCOUNTER — RESULT REVIEW (OUTPATIENT)
Age: 39
End: 2024-02-02

## 2024-02-02 ENCOUNTER — APPOINTMENT (OUTPATIENT)
Dept: MAMMOGRAPHY | Facility: CLINIC | Age: 39
End: 2024-02-02
Payer: COMMERCIAL

## 2024-02-02 PROCEDURE — 77063 BREAST TOMOSYNTHESIS BI: CPT

## 2024-02-02 PROCEDURE — 77067 SCR MAMMO BI INCL CAD: CPT

## 2024-02-11 DIAGNOSIS — Z12.39 ENCOUNTER FOR OTHER SCREENING FOR MALIGNANT NEOPLASM OF BREAST: ICD-10-CM

## 2024-02-12 ENCOUNTER — LABORATORY RESULT (OUTPATIENT)
Age: 39
End: 2024-02-12

## 2024-02-12 ENCOUNTER — APPOINTMENT (OUTPATIENT)
Dept: OBGYN | Facility: CLINIC | Age: 39
End: 2024-02-12
Payer: COMMERCIAL

## 2024-02-12 VITALS
BODY MASS INDEX: 24.64 KG/M2 | SYSTOLIC BLOOD PRESSURE: 112 MMHG | WEIGHT: 157 LBS | HEIGHT: 67 IN | DIASTOLIC BLOOD PRESSURE: 79 MMHG

## 2024-02-12 DIAGNOSIS — Z01.419 ENCOUNTER FOR GYNECOLOGICAL EXAMINATION (GENERAL) (ROUTINE) W/OUT ABNORMAL FINDINGS: ICD-10-CM

## 2024-02-12 LAB
HCG UR QL: NEGATIVE
QUALITY CONTROL: YES

## 2024-02-12 PROCEDURE — 81025 URINE PREGNANCY TEST: CPT

## 2024-02-12 PROCEDURE — 99395 PREV VISIT EST AGE 18-39: CPT

## 2024-02-12 NOTE — HISTORY OF PRESENT ILLNESS
[TextBox_4] : 39 yo  for well woman exam.  Currently on  LoEstrin without adverse effects and would like to renew.  Maternal and MGM hx breast CA - patient tested BRCA negative.  No hx abnormal paps

## 2024-02-12 NOTE — DISCUSSION/SUMMARY
[FreeTextEntry1] : Unremarkable CBE and pelvic exam SBE reviewed and taught on model Pap and HPV collected Healthy diet, exercise, sleep hygiene discussed No other gyn concerns today Rx LoEstrin RTO x 1 year or prn

## 2024-02-12 NOTE — PHYSICAL EXAM
[Appropriately responsive] : appropriately responsive [Alert] : alert [No Acute Distress] : no acute distress [No Lymphadenopathy] : no lymphadenopathy [Regular Rate Rhythm] : regular rate rhythm [No Murmurs] : no murmurs [Clear to Auscultation B/L] : clear to auscultation bilaterally [Soft] : soft [Non-tender] : non-tender [Non-distended] : non-distended [No HSM] : No HSM [No Lesions] : no lesions [No Mass] : no mass [Oriented x3] : oriented x3 [FreeTextEntry4] : heart on right side [FreeTextEntry5] : left lung only [Examination Of The Breasts] : a normal appearance [No Masses] : no breast masses were palpable [Labia Majora] : normal [Labia Minora] : normal [Normal] : normal [Uterine Adnexae] : normal

## 2024-02-15 LAB — HPV HIGH+LOW RISK DNA PNL CVX: DETECTED

## 2024-02-20 LAB — CYTOLOGY CVX/VAG DOC THIN PREP: NORMAL

## 2024-02-21 ENCOUNTER — APPOINTMENT (OUTPATIENT)
Dept: MRI IMAGING | Facility: CLINIC | Age: 39
End: 2024-02-21
Payer: COMMERCIAL

## 2024-02-21 PROCEDURE — A9585: CPT

## 2024-02-21 PROCEDURE — 77049 MRI BREAST C-+ W/CAD BI: CPT

## 2024-04-22 ENCOUNTER — APPOINTMENT (OUTPATIENT)
Dept: PULMONOLOGY | Facility: CLINIC | Age: 39
End: 2024-04-22
Payer: COMMERCIAL

## 2024-04-22 DIAGNOSIS — R06.02 SHORTNESS OF BREATH: ICD-10-CM

## 2024-04-22 DIAGNOSIS — F41.9 ANXIETY DISORDER, UNSPECIFIED: ICD-10-CM

## 2024-04-22 DIAGNOSIS — J45.909 UNSPECIFIED ASTHMA, UNCOMPLICATED: ICD-10-CM

## 2024-04-22 DIAGNOSIS — J93.11 PRIMARY SPONTANEOUS PNEUMOTHORAX: ICD-10-CM

## 2024-04-22 PROCEDURE — 99214 OFFICE O/P EST MOD 30 MIN: CPT

## 2024-04-22 RX ORDER — NIRMATRELVIR AND RITONAVIR 300-100 MG
20 X 150 MG & KIT ORAL
Qty: 1 | Refills: 0 | Status: DISCONTINUED | COMMUNITY
Start: 2023-09-18 | End: 2024-04-22

## 2024-04-22 NOTE — REASON FOR VISIT
[Follow-Up] : a follow-up visit [TextBox_44] : via video call: COVID-19 (7/2022, 9/2023); SOB, RADS/asthma, prior left sided spontaneous PTX, agenesis left lung

## 2024-04-22 NOTE — ADDENDUM
[FreeTextEntry1] : Documented by Donis Pichardo acting as a scribe for Dr. Vladimir Bennett on 04/22/2024.   All medical record entries made by the Scribe were at my, Dr. Vladimir Bennett's, direction and personally dictated by me on 04/22/2024. I have reviewed the chart and agree that the record accurately reflects my personal performance of the history, physical exam, assessment and plan. I have also personally directed, reviewed, and agree with the discharge instructions.

## 2024-04-22 NOTE — HISTORY OF PRESENT ILLNESS
[Home] : at home, [unfilled] , at the time of the visit. [Medical Office: ([facility])___] : at the medical office located in  [Verbal consent obtained from patient] : the patient, [unfilled] [TextBox_4] : Ms. HORTON is a 38 year old female with a history of dextrocardia, pneumothorax on the left lung (2015), VATS pleurodesis (2015), reactive airways growing up (?asthma), receiving IVF for infertility, who now comes via video call in for a follow up pulmonary evaluation. Her chief complaint is  - she notes feeling generally well - she notes her energy levels are okay - she notes occasional night sweats - she notes walking for exercise - she notes currently having a cold which has not dropped into her chest - she notes occasional restless legs for which she tried Magnesium a few years ago  - she notes her restless legs have improved since pregnancy - she notes with over exertion she feels pinching in the area of her previous pneumothorax and resolves quickly on its own - she notes trying to lose weight since 1/2024 - she notes no new medications, vitamins, or supplements  -she denies any headaches, nausea, emesis, fever, chills, sweats, chest pain, chest pressure, coughing, wheezing, palpitations, constipation, diarrhea, vertigo, dysphagia, heartburn, reflux, itchy eyes, itchy ears, leg swelling, leg pain, arthralgias, myalgias, hoarseness, or sour taste in the mouth.

## 2024-04-22 NOTE — ASSESSMENT
[FreeTextEntry1] : Ms. HORTON is a 38 year old female with a history of dextrocardia, pneumothorax on the left lung (2015)), VATS pleurodesis (2015), reactive airways growing up (?asthma), s/p IVF for infertility, who now comes in via video call for a follow up  pulmonary evaluation for infertility therapy, SOB, RADS/asthma, prior left sided spontaneous PTX - s/p is COVID-19 7/2022; s/p COVID-19 9/2023 Sx - Stable overall  The patient's SOB is felt to be multifactorial: -poor mechanics of breathing -out of shape -Pulmonary  -Hx of spontaneous PTX  -volume loss (c/w restrictive lung disease)  -RADS/asthma  -anemia -Cardiac  -dextrocardia (Daniel Goldberg)  Problem 1: RADS/asthma - quiet -continue Bevespi 2 puffs BID or Anoro at 1 inhalation QD or Qvar Redihaler 80 2 puffs BID  -continue ProAir 2 puffs Q6H, pre-exercise - Inhaler technique reviewed as well as oral hygiene technique reviewed with patient. Avoidance of cold air, extremes of temperature, rescue inhaler should be used before exercise. Order of medication reviewed with patient. Recommended use of a cool mist humidifier in the bedroom.  - Asthma is believed to be caused by inherited (genetic) and environmental factor, but its exact cause is unknown. asthma may be triggered by allergens, lung infections, or irritants in the air. Asthma triggers are different for each person   Problem 1A COVID-19 7/2022 (resolved) -Recommend "Sanotize" nasal spray  - MAB  - [place] Cold and Flu  - s/p COVID-19 vax x3  - no Paxlovid   Problem 1B: COVID-19 9/2023 (resolved) -Rx for Paxlovid -complete a 7-day quarantine -s/p [place]ali [place] Cold & Flu   Problem 2: Allergies (NC)- quiet  -get Blood work to include: asthma panel, food IgE panel, IgE level, eosinophil level, vitamin D level (NC) -Environmental measures for allergies were encouraged including mattress and pillow cover, air purifier, and environmental controls.   Problem 3: Anemia -Complete iron studies (NC) -s/p supplements   Problem 4: Hx of pneumothorax s/p pleurodesis- Agenesis left lung  -appears to be quiescent The patient has a pneumothorax from trauma, iatrogenic, post surgical. You are at risk for one in the future and may need hospitalization. Thoracic surgical evaluation is needed for chest tube and other intervention.    Problem 5:Cardiac -Recommend cardiac follow up evaluation with cardiologist Dr. Joel Goldberg   Problem 6:overweight/out of shape -Recommend "Muniq" OTC for weight loss, energy, and blood sugar  -Recommended Fernando Dari book on 10 day Detox  -Recommended to limit salt / calories  - Weight loss, exercise and diet control were discussed and are highly encouraged. Treatment options were given such as aqua therapy, and contacting a nutritionist. Recommended to use the elliptical, stationary bike, less use of treadmill. Mindful eating was explained to the patient. Obesity is associated with worsening asthma, SOB, and potential for cardiac disease, diabetes, and other underlying medical conditions.  Problem 7: Poor mechanics of breathing - Recommended "The Gift of Maybe" by Susie Ba for anxiety -Recommend Ana Montgomery breathing techniques  - Proper breathing techniques were reviewed with an emphasis on exhalation. Patient instructed to breath in for 1 second and out for four seconds. Patient was encouraged not to talk while walking.  Problem 8: Health Maintenance -Epifanio [facility]  -s/p COVID-19 vaccine x4 -s/p flu shot 2023 -recommended strep pneumonia vaccines: Prevnar-13 vaccine, follow by Pneumo vaccine 23 one year following -recommended early intervention for URIs -recommended regular osteoporosis evaluations -recommended early dermatological evaluations -recommended after the age of 50 to the age of 70, colonoscopy every 5 years  F/P in 4-6 months  pt is encouraged to call or fax the office with any questions or concerns.

## 2024-06-24 RX ORDER — NORETHINDRONE ACETATE AND ETHINYL ESTRADIOL, ETHINYL ESTRADIOL AND FERROUS FUMARATE 1MG-10(24)
1 MG-10 MCG / KIT ORAL DAILY
Qty: 3 | Refills: 1 | Status: ACTIVE | COMMUNITY
Start: 2023-02-01 | End: 1900-01-01

## 2024-06-28 NOTE — OB RN DELIVERY SUMMARY - NS_FETALMONITOR_OBGYN_ALL_OB
External [facility]ague/External [facility]
  Number of days:        Wound 06/21/24 Wound #1 Left Arm (Active)   Wound Image   06/21/24 1243   Wound Etiology Non-Healing Surgical 06/28/24 1425   Wound Cleansed Wound cleanser 06/28/24 1425   Offloading for Diabetic Foot Ulcers Offloading not required 06/28/24 1425   Wound Length (cm) 3.9 cm 06/28/24 1425   Wound Width (cm) 3 cm 06/28/24 1425   Wound Depth (cm) 0.1 cm 06/28/24 1425   Wound Surface Area (cm^2) 11.7 cm^2 06/28/24 1425   Change in Wound Size % (l*w) 16.43 06/28/24 1425   Wound Volume (cm^3) 1.17 cm^3 06/28/24 1425   Wound Healing % 16 06/28/24 1425   Post-Procedure Length (cm) 3.9 cm 06/28/24 1437   Post-Procedure Width (cm) 3 cm 06/28/24 1437   Post-Procedure Depth (cm) 0.1 cm 06/28/24 1437   Post-Procedure Surface Area (cm^2) 11.7 cm^2 06/28/24 1437   Post-Procedure Volume (cm^3) 1.17 cm^3 06/28/24 1437   Distance Tunneling (cm) 0 cm 06/28/24 1425   Tunneling Position ___ O'Clock 0 06/28/24 1425   Undermining Starts ___ O'Clock 0 06/28/24 1425   Undermining Ends___ O'Clock 0 06/28/24 1425   Undermining Maxium Distance (cm) 0 06/28/24 1425   Wound Assessment Pink/red 06/28/24 1425   Drainage Amount Moderate (25-50%) 06/28/24 1425   Drainage Description Serosanguinous;Yellow;Green;Brown 06/28/24 1425   Odor None 06/28/24 1425   Ro-wound Assessment Intact 06/28/24 1425   Margins Defined edges 06/28/24 1425   Wound Thickness Description not for Pressure Injury Full thickness 06/28/24 1425   Number of days: 7       Percent of Wound(s) Debrided: approximately 100%    Total  Area  Debrided:  11.7 sq cm     Bleeding:  Minimal    Hemostasis Achieved:  by pressure    Procedural Pain:  0  / 10     Post Procedural Pain:  0 / 10     Response to treatment:  Well tolerated by patient.     Status of wound progress and description from last visit:   improved.      Plan:       Patient Instructions   PHYSICIAN ORDERS AND DISCHARGE INSTRUCTIONS    NOTE: Upon discharge from the Wound Center, you will receive a

## 2024-08-05 PROBLEM — Z91.89 AT HIGH RISK FOR BREAST CANCER: Status: ACTIVE | Noted: 2024-08-05

## 2024-08-16 ENCOUNTER — APPOINTMENT (OUTPATIENT)
Dept: MRI IMAGING | Facility: CLINIC | Age: 39
End: 2024-08-16
Payer: COMMERCIAL

## 2024-08-16 PROCEDURE — A9585: CPT | Mod: JW

## 2024-08-16 PROCEDURE — 77049 MRI BREAST C-+ W/CAD BI: CPT

## 2024-09-17 ENCOUNTER — APPOINTMENT (OUTPATIENT)
Dept: PULMONOLOGY | Facility: CLINIC | Age: 39
End: 2024-09-17
Payer: COMMERCIAL

## 2024-09-17 VITALS
DIASTOLIC BLOOD PRESSURE: 70 MMHG | OXYGEN SATURATION: 97 % | RESPIRATION RATE: 16 BRPM | TEMPERATURE: 97.6 F | HEIGHT: 67 IN | HEART RATE: 93 BPM | SYSTOLIC BLOOD PRESSURE: 102 MMHG | WEIGHT: 157 LBS | BODY MASS INDEX: 24.64 KG/M2

## 2024-09-17 DIAGNOSIS — F41.9 ANXIETY DISORDER, UNSPECIFIED: ICD-10-CM

## 2024-09-17 DIAGNOSIS — Q33.3 AGENESIS OF LUNG: ICD-10-CM

## 2024-09-17 DIAGNOSIS — Q24.0 DEXTROCARDIA: ICD-10-CM

## 2024-09-17 DIAGNOSIS — J45.909 UNSPECIFIED ASTHMA, UNCOMPLICATED: ICD-10-CM

## 2024-09-17 DIAGNOSIS — R06.02 SHORTNESS OF BREATH: ICD-10-CM

## 2024-09-17 PROCEDURE — 94727 GAS DIL/WSHOT DETER LNG VOL: CPT

## 2024-09-17 PROCEDURE — ZZZZZ: CPT

## 2024-09-17 PROCEDURE — 94729 DIFFUSING CAPACITY: CPT

## 2024-09-17 PROCEDURE — 99214 OFFICE O/P EST MOD 30 MIN: CPT | Mod: 25

## 2024-09-17 PROCEDURE — 95012 NITRIC OXIDE EXP GAS DETER: CPT

## 2024-09-17 PROCEDURE — 94010 BREATHING CAPACITY TEST: CPT

## 2024-09-17 NOTE — PROCEDURE
[FreeTextEntry1] : Full PFT reveals mild restrictive dysfunction and obstructive dysfunction; FEV1 was  1.93L which is 53% of predicted; normal lung volumes; normal diffusion at 4.11, which is 69% of predicted; normal flow volume loop. PFTs were performed to evaluate for SOB  FENO was 18; a normal value being less than 25 Fractional exhaled nitric oxide (FENO) is regarded as a simple, noninvasive method for assessing eosinophilic airway inflammation. Produced by a variety of cells within the lung, nitric oxide (NO) concentrations are generally low in healthy individuals. However, high concentrations of NO appear to be involved in nonspecific host defense mechanisms and chronic inflammatory diseases such as asthma. The American Thoracic Society (ATS) therefore has recommended using FENO to aid in the diagnosis and monitoring of eosinophilic airway inflammation and asthma, and for identifying steroid responsive individuals whose chronic respiratory symptoms may be caused by airway inflammation.   The American Thoracic Society (ATS) strongly recommends the use of FeNO measurement to aid in the assessment, management, and long-term monitoring of asthma. In their 2011 clinical practice guideline, the ATS emphasizes the importance of using FeNO.

## 2024-09-17 NOTE — REASON FOR VISIT
[TextBox_44] : via video call: COVID-19 (7/2022, 9/2023, 8/2024); SOB, RADS/asthma, prior left sided spontaneous PTX, agenesis left lung

## 2024-09-17 NOTE — HISTORY OF PRESENT ILLNESS
[TextBox_4] : Ms. HORTON is a 39 year old female with a history of dextrocardia, pneumothorax on the left lung (2015), VATS pleurodesis (2015), reactive airways growing up (?asthma), receiving IVF for infertility, who now comes via video call in for a follow up pulmonary evaluation. Her chief complaint is  -she notes feeling generally well -she notes exercising (biking and weights) -she notes s/p COVID 19 x 3  -she notes intermittent chest pressure  -she notes her senses of smell and taste are stable -she notes good quality of sleep -she notes sinuses are active  -she notes her breathing is stable -she notes wanting to lose weight   -she denies any headaches, nausea, emesis, fever, chills, sweats, chest pain, coughing, wheezing, palpitations, diarrhea, constipation, dysphagia, vertigo, arthralgias, myalgias, leg swelling, itchy eyes, itchy ears, heartburn, reflux, or sour taste in the mouth.

## 2024-09-17 NOTE — ASSESSMENT
[FreeTextEntry1] : Ms. HORTON is a 39 year old female with a history of dextrocardia, pneumothorax on the left lung (2015)), VATS pleurodesis (2015), reactive airways growing up (?asthma), s/p IVF for infertility, who now comes in for a follow up  pulmonary evaluation for infertility therapy, SOB, RADS/asthma, prior left sided spontaneous PTX - s/p is COVID-19 7/2022; s/p COVID-19 9/2023 Sx - Stable overall, despite COVID 19 x 3 (8/2024)  The patient's SOB is felt to be multifactorial: -poor mechanics of breathing -out of shape -Pulmonary  -Hx of spontaneous PTX  -volume loss (c/w restrictive lung disease)  -RADS/asthma  -anemia -Cardiac  -dextrocardia (Danielmurali MilnerGoldberg)  Problem 1: RADS/asthma - quiet -continue Bevespi 2 puffs BID or Anoro at 1 inhalation QD or Qvar Redihaler 80 2 puffs BID  -continue ProAir 2 puffs Q6H, pre-exercise - Inhaler technique reviewed as well as oral hygiene technique reviewed with patient. Avoidance of cold air, extremes of temperature, rescue inhaler should be used before exercise. Order of medication reviewed with patient. Recommended use of a cool mist humidifier in the bedroom.  - Asthma is believed to be caused by inherited (genetic) and environmental factor, but its exact cause is unknown. asthma may be triggered by allergens, lung infections, or irritants in the air. Asthma triggers are different for each person   Problem 1A COVID-19 7/2022 (resolved) -Recommend "Sanotize" nasal spray  - MAB  - Lali-[place] Cold and Flu  - s/p COVID-19 vax x3  - no Paxlovid   Problem 1B: COVID-19 9/2023 (resolved) -Rx for Paxlovid -complete a 7-day quarantine -s/p Lali [place] Cold & Flu   Problem 2: Allergies (NC)- quiet  -add Claritin 10 mg QAM -s/p Blood work to include: (+) asthma panel, (-)food IgE panel, IgE level, eosinophil level, vitamin D level (2021) -Environmental measures for allergies were encouraged including mattress and pillow cover, air purifier, and environmental controls.   Problem 3: Anemia -Complete iron studies (NC) -s/p supplements   Problem 4: Hx of pneumothorax s/p pleurodesis- Agenesis left lung  -appears to be quiescent The patient has a pneumothorax from trauma, iatrogenic, post surgical. You are at risk for one in the future and may need hospitalization. Thoracic surgical evaluation is needed for chest tube and other intervention.    Problem 5:Cardiac/dextrocardia  -Recommend cardiac follow up evaluation with cardiologist Dr. Joel Goldberg   Problem 6:overweight/out of shape -Recommend "Muniq" OTC for weight loss, energy, and blood sugar  -Recommended Fernando Dari book on 10 day Detox  -Recommended to limit salt / calories  - Weight loss, exercise and diet control were discussed and are highly encouraged. Treatment options were given such as aqua therapy, and contacting a nutritionist. Recommended to use the elliptical, stationary bike, less use of treadmill. Mindful eating was explained to the patient. Obesity is associated with worsening asthma, SOB, and potential for cardiac disease, diabetes, and other underlying medical conditions.  Problem 7: Poor mechanics of breathing - Recommended "The Gift of Maybe" by Susie Ba for anxiety -Recommend Ana Posada and Eleonora breathing techniques  - Proper breathing techniques were reviewed with an emphasis on exhalation. Patient instructed to breath in for 1 second and out for four seconds. Patient was encouraged not to talk while walking.  Problem 8: Health Maintenance -Epifanio Cheman Foundational stretches  -s/p COVID-19 vaccine x4 -s/p flu shot 2023 -recommended strep pneumonia vaccines: Prevnar-13 vaccine, follow by Pneumo vaccine 23 one year following -recommended early intervention for URIs -recommended regular osteoporosis evaluations -recommended early dermatological evaluations -recommended after the age of 50 to the age of 70, colonoscopy every 5 years  F/P in 4-6 months  pt is encouraged to call or fax the office with any questions or concerns.

## 2024-09-17 NOTE — PHYSICAL EXAM
[Normal Oropharynx] : normal oropharynx [III] : Mallampati Class: III [Normal Appearance] : normal appearance [No Neck Mass] : no neck mass [Normal Rate/Rhythm] : normal rate/rhythm [Normal S1, S2] : normal s1, s2 [No Murmurs] : no murmurs [No Resp Distress] : no resp distress [Clear to Auscultation Bilaterally] : clear to auscultation bilaterally [No Abnormalities] : no abnormalities [Benign] : benign [Normal Gait] : normal gait [No Clubbing] : no clubbing [No Cyanosis] : no cyanosis [No Edema] : no edema [FROM] : FROM [Normal Color/ Pigmentation] : normal color/ pigmentation [No Focal Deficits] : no focal deficits [Oriented x3] : oriented x3 [Normal Affect] : normal affect [TextBox_68] : I:E ratio 1:3; decreased breath sounds at R base, L clear

## 2024-09-17 NOTE — ADDENDUM
[FreeTextEntry1] : Documented by Woodrow Seaman acting as a scribe for Dr. Vladimir Bennett on 09/17/2024. All medical record entries made by the Scribe were at my, Dr. Vladimir Bennett's, direction and personally dictated by me on 09/17/2024. I have reviewed the chart and agree that the record accurately reflects my personal performance of the history, physical exam, assessment and plan. I have also personally directed, reviewed, and agree with the discharge instructions.

## 2024-10-23 ENCOUNTER — RX RENEWAL (OUTPATIENT)
Age: 39
End: 2024-10-23

## 2024-12-05 ENCOUNTER — APPOINTMENT (OUTPATIENT)
Dept: PULMONOLOGY | Facility: CLINIC | Age: 39
End: 2024-12-05
Payer: COMMERCIAL

## 2024-12-05 VITALS
DIASTOLIC BLOOD PRESSURE: 68 MMHG | TEMPERATURE: 97 F | HEART RATE: 83 BPM | WEIGHT: 148 LBS | BODY MASS INDEX: 23.23 KG/M2 | RESPIRATION RATE: 16 BRPM | SYSTOLIC BLOOD PRESSURE: 100 MMHG | OXYGEN SATURATION: 99 % | HEIGHT: 67 IN

## 2024-12-05 DIAGNOSIS — Q24.0 DEXTROCARDIA: ICD-10-CM

## 2024-12-05 DIAGNOSIS — R06.02 SHORTNESS OF BREATH: ICD-10-CM

## 2024-12-05 PROCEDURE — 99214 OFFICE O/P EST MOD 30 MIN: CPT

## 2024-12-06 PROBLEM — J90 PLEURAL EFFUSION: Status: ACTIVE | Noted: 2024-12-05

## 2024-12-07 LAB
BASOPHILS # BLD AUTO: 0.05 K/UL
BASOPHILS NFR BLD AUTO: 0.7 %
EOSINOPHIL # BLD AUTO: 0.12 K/UL
EOSINOPHIL NFR BLD AUTO: 1.7 %
HCT VFR BLD CALC: 38.5 %
HGB BLD-MCNC: 12.6 G/DL
IMM GRANULOCYTES NFR BLD AUTO: 0.3 %
LYMPHOCYTES # BLD AUTO: 2.19 K/UL
LYMPHOCYTES NFR BLD AUTO: 30.6 %
MAN DIFF?: NORMAL
MCHC RBC-ENTMCNC: 30.4 PG
MCHC RBC-ENTMCNC: 32.7 G/DL
MCV RBC AUTO: 92.8 FL
MONOCYTES # BLD AUTO: 0.53 K/UL
MONOCYTES NFR BLD AUTO: 7.4 %
NEUTROPHILS # BLD AUTO: 4.25 K/UL
NEUTROPHILS NFR BLD AUTO: 59.3 %
PLATELET # BLD AUTO: 295 K/UL
RBC # BLD: 4.15 M/UL
RBC # FLD: 11.8 %
WBC # FLD AUTO: 7.16 K/UL

## 2024-12-08 LAB
INR PPP: 0.88 RATIO
PT BLD: 10.5 SEC

## 2024-12-09 ENCOUNTER — RX RENEWAL (OUTPATIENT)
Age: 39
End: 2024-12-09

## 2024-12-09 ENCOUNTER — APPOINTMENT (OUTPATIENT)
Dept: THORACIC SURGERY | Facility: CLINIC | Age: 39
End: 2024-12-09
Payer: COMMERCIAL

## 2024-12-09 ENCOUNTER — APPOINTMENT (OUTPATIENT)
Dept: RADIOLOGY | Facility: HOSPITAL | Age: 39
End: 2024-12-09

## 2024-12-09 ENCOUNTER — OUTPATIENT (OUTPATIENT)
Dept: OUTPATIENT SERVICES | Facility: HOSPITAL | Age: 39
LOS: 1 days | End: 2024-12-09
Payer: COMMERCIAL

## 2024-12-09 VITALS
RESPIRATION RATE: 16 BRPM | OXYGEN SATURATION: 98 % | BODY MASS INDEX: 23.78 KG/M2 | HEIGHT: 66 IN | DIASTOLIC BLOOD PRESSURE: 72 MMHG | SYSTOLIC BLOOD PRESSURE: 115 MMHG | HEART RATE: 86 BPM | WEIGHT: 148 LBS

## 2024-12-09 DIAGNOSIS — J90 PLEURAL EFFUSION, NOT ELSEWHERE CLASSIFIED: ICD-10-CM

## 2024-12-09 DIAGNOSIS — Z98.891 HISTORY OF UTERINE SCAR FROM PREVIOUS SURGERY: Chronic | ICD-10-CM

## 2024-12-09 PROCEDURE — 99244 OFF/OP CNSLTJ NEW/EST MOD 40: CPT

## 2024-12-09 PROCEDURE — 71046 X-RAY EXAM CHEST 2 VIEWS: CPT | Mod: 26

## 2024-12-10 ENCOUNTER — APPOINTMENT (OUTPATIENT)
Dept: CT IMAGING | Facility: CLINIC | Age: 39
End: 2024-12-10
Payer: COMMERCIAL

## 2024-12-10 PROCEDURE — 71250 CT THORAX DX C-: CPT

## 2024-12-16 ENCOUNTER — APPOINTMENT (OUTPATIENT)
Dept: THORACIC SURGERY | Facility: CLINIC | Age: 39
End: 2024-12-16
Payer: COMMERCIAL

## 2024-12-16 DIAGNOSIS — Q33.3 AGENESIS OF LUNG: ICD-10-CM

## 2024-12-16 PROCEDURE — 99442: CPT

## 2024-12-18 ENCOUNTER — APPOINTMENT (OUTPATIENT)
Dept: ULTRASOUND IMAGING | Facility: HOSPITAL | Age: 39
End: 2024-12-18

## 2025-02-13 ENCOUNTER — NON-APPOINTMENT (OUTPATIENT)
Age: 40
End: 2025-02-13

## 2025-03-17 ENCOUNTER — APPOINTMENT (OUTPATIENT)
Dept: OBGYN | Facility: CLINIC | Age: 40
End: 2025-03-17
Payer: COMMERCIAL

## 2025-03-17 VITALS
SYSTOLIC BLOOD PRESSURE: 114 MMHG | WEIGHT: 148 LBS | DIASTOLIC BLOOD PRESSURE: 72 MMHG | BODY MASS INDEX: 24.66 KG/M2 | HEIGHT: 65 IN

## 2025-03-17 DIAGNOSIS — Z11.3 ENCOUNTER FOR SCREENING FOR INFECTIONS WITH A PREDOMINANTLY SEXUAL MODE OF TRANSMISSION: ICD-10-CM

## 2025-03-17 DIAGNOSIS — Z01.419 ENCOUNTER FOR GYNECOLOGICAL EXAMINATION (GENERAL) (ROUTINE) W/OUT ABNORMAL FINDINGS: ICD-10-CM

## 2025-03-17 LAB
HCG UR QL: NEGATIVE
QUALITY CONTROL: YES

## 2025-03-17 PROCEDURE — 99395 PREV VISIT EST AGE 18-39: CPT

## 2025-03-17 PROCEDURE — 81025 URINE PREGNANCY TEST: CPT

## 2025-03-18 LAB — HPV HIGH+LOW RISK DNA PNL CVX: NOT DETECTED

## 2025-03-21 LAB — CYTOLOGY CVX/VAG DOC THIN PREP: NORMAL

## 2025-03-24 NOTE — REASON FOR VISIT
MERCY Margi Turcios, APRN - CNP   [facility]  [phone]  03/24/25            [Follow-Up] : a follow-up visit [TextBox_44] :  via video call - COVID-197/2022, SOB, RADS/asthma, prior left sided spontaneous PTX, agenesis left lung

## 2025-03-25 ENCOUNTER — APPOINTMENT (OUTPATIENT)
Dept: PULMONOLOGY | Facility: CLINIC | Age: 40
End: 2025-03-25

## 2025-04-07 ENCOUNTER — APPOINTMENT (OUTPATIENT)
Dept: MAMMOGRAPHY | Facility: CLINIC | Age: 40
End: 2025-04-07
Payer: COMMERCIAL

## 2025-04-07 ENCOUNTER — APPOINTMENT (OUTPATIENT)
Dept: MRI IMAGING | Facility: CLINIC | Age: 40
End: 2025-04-07

## 2025-04-07 ENCOUNTER — RESULT REVIEW (OUTPATIENT)
Age: 40
End: 2025-04-07

## 2025-04-07 DIAGNOSIS — R92.8 OTHER ABNORMAL AND INCONCLUSIVE FINDINGS ON DIAGNOSTIC IMAGING OF BREAST: ICD-10-CM

## 2025-04-07 PROCEDURE — 77067 SCR MAMMO BI INCL CAD: CPT

## 2025-04-07 PROCEDURE — A9585: CPT

## 2025-04-07 PROCEDURE — 77049 MRI BREAST C-+ W/CAD BI: CPT

## 2025-04-07 PROCEDURE — 77063 BREAST TOMOSYNTHESIS BI: CPT

## 2025-04-17 ENCOUNTER — APPOINTMENT (OUTPATIENT)
Dept: ULTRASOUND IMAGING | Facility: CLINIC | Age: 40
End: 2025-04-17

## 2025-04-17 ENCOUNTER — APPOINTMENT (OUTPATIENT)
Dept: MAMMOGRAPHY | Facility: CLINIC | Age: 40
End: 2025-04-17

## 2025-04-17 ENCOUNTER — OUTPATIENT (OUTPATIENT)
Dept: OUTPATIENT SERVICES | Facility: HOSPITAL | Age: 40
LOS: 1 days | End: 2025-04-17
Payer: COMMERCIAL

## 2025-04-17 ENCOUNTER — RESULT REVIEW (OUTPATIENT)
Age: 40
End: 2025-04-17

## 2025-04-17 ENCOUNTER — APPOINTMENT (OUTPATIENT)
Dept: MRI IMAGING | Facility: CLINIC | Age: 40
End: 2025-04-17
Payer: COMMERCIAL

## 2025-04-17 DIAGNOSIS — Z98.891 HISTORY OF UTERINE SCAR FROM PREVIOUS SURGERY: Chronic | ICD-10-CM

## 2025-04-17 DIAGNOSIS — Z98.890 OTHER SPECIFIED POSTPROCEDURAL STATES: Chronic | ICD-10-CM

## 2025-04-17 DIAGNOSIS — R92.8 OTHER ABNORMAL AND INCONCLUSIVE FINDINGS ON DIAGNOSTIC IMAGING OF BREAST: ICD-10-CM

## 2025-04-17 DIAGNOSIS — Z00.8 ENCOUNTER FOR OTHER GENERAL EXAMINATION: ICD-10-CM

## 2025-04-17 PROCEDURE — 19085 BX BREAST 1ST LESION MR IMAG: CPT

## 2025-04-17 PROCEDURE — 88305 TISSUE EXAM BY PATHOLOGIST: CPT | Mod: 26

## 2025-04-17 PROCEDURE — 77065 DX MAMMO INCL CAD UNI: CPT

## 2025-04-17 PROCEDURE — 77065 DX MAMMO INCL CAD UNI: CPT | Mod: 26,LT

## 2025-04-17 PROCEDURE — A9585: CPT

## 2025-04-17 PROCEDURE — 19085 BX BREAST 1ST LESION MR IMAG: CPT | Mod: LT

## 2025-04-17 PROCEDURE — 88305 TISSUE EXAM BY PATHOLOGIST: CPT

## 2025-04-17 PROCEDURE — A4648: CPT

## 2025-04-21 ENCOUNTER — NON-APPOINTMENT (OUTPATIENT)
Age: 40
End: 2025-04-21

## 2025-04-21 LAB — SURGICAL PATHOLOGY STUDY: SIGNIFICANT CHANGE UP

## 2025-04-29 ENCOUNTER — APPOINTMENT (OUTPATIENT)
Dept: ULTRASOUND IMAGING | Facility: CLINIC | Age: 40
End: 2025-04-29

## 2025-05-23 ENCOUNTER — RESULT REVIEW (OUTPATIENT)
Age: 40
End: 2025-05-23

## 2025-05-23 ENCOUNTER — APPOINTMENT (OUTPATIENT)
Dept: ULTRASOUND IMAGING | Facility: CLINIC | Age: 40
End: 2025-05-23
Payer: COMMERCIAL

## 2025-05-23 PROCEDURE — 76700 US EXAM ABDOM COMPLETE: CPT
